# Patient Record
Sex: FEMALE | Race: WHITE | NOT HISPANIC OR LATINO
[De-identification: names, ages, dates, MRNs, and addresses within clinical notes are randomized per-mention and may not be internally consistent; named-entity substitution may affect disease eponyms.]

---

## 2018-01-22 ENCOUNTER — APPOINTMENT (OUTPATIENT)
Dept: SURGERY | Facility: CLINIC | Age: 73
End: 2018-01-22
Payer: MEDICARE

## 2018-01-22 PROCEDURE — 99213 OFFICE O/P EST LOW 20 MIN: CPT

## 2018-03-19 ENCOUNTER — APPOINTMENT (OUTPATIENT)
Dept: OPHTHALMOLOGY | Facility: CLINIC | Age: 73
End: 2018-03-19
Payer: MEDICARE

## 2018-03-19 PROCEDURE — 92014 COMPRE OPH EXAM EST PT 1/>: CPT

## 2018-12-31 ENCOUNTER — APPOINTMENT (OUTPATIENT)
Dept: SURGERY | Facility: CLINIC | Age: 73
End: 2018-12-31
Payer: MEDICARE

## 2018-12-31 DIAGNOSIS — D05.10 INTRADUCTAL CARCINOMA IN SITU OF UNSPECIFIED BREAST: ICD-10-CM

## 2018-12-31 PROCEDURE — 99213 OFFICE O/P EST LOW 20 MIN: CPT

## 2018-12-31 RX ORDER — FLASH GLUCOSE SCANNING READER
EACH MISCELLANEOUS
Qty: 1 | Refills: 0 | Status: DISCONTINUED | COMMUNITY
Start: 2018-12-20

## 2018-12-31 RX ORDER — INSULIN ASPART 100 [IU]/ML
100 INJECTION, SOLUTION INTRAVENOUS; SUBCUTANEOUS
Qty: 15 | Refills: 0 | Status: DISCONTINUED | COMMUNITY
Start: 2018-01-02

## 2018-12-31 RX ORDER — BLOOD-GLUCOSE METER
70 EACH MISCELLANEOUS
Qty: 100 | Refills: 0 | Status: DISCONTINUED | COMMUNITY
Start: 2018-10-14

## 2018-12-31 RX ORDER — FLASH GLUCOSE SENSOR
KIT MISCELLANEOUS
Qty: 2 | Refills: 0 | Status: DISCONTINUED | COMMUNITY
Start: 2018-12-20

## 2018-12-31 RX ORDER — BLOOD SUGAR DIAGNOSTIC
STRIP MISCELLANEOUS
Qty: 100 | Refills: 0 | Status: DISCONTINUED | COMMUNITY
Start: 2017-11-29

## 2018-12-31 RX ORDER — PEN NEEDLE, DIABETIC 32GX 5/32"
32G X 4 MM NEEDLE, DISPOSABLE MISCELLANEOUS
Qty: 100 | Refills: 0 | Status: DISCONTINUED | COMMUNITY
Start: 2018-12-18

## 2018-12-31 RX ORDER — INSULIN GLARGINE 100 [IU]/ML
100 INJECTION, SOLUTION SUBCUTANEOUS
Qty: 30 | Refills: 0 | Status: DISCONTINUED | COMMUNITY
Start: 2018-07-10

## 2018-12-31 RX ORDER — BLOOD-GLUCOSE METER
EACH MISCELLANEOUS
Qty: 100 | Refills: 0 | Status: DISCONTINUED | COMMUNITY
Start: 2018-10-14

## 2019-04-25 ENCOUNTER — APPOINTMENT (OUTPATIENT)
Dept: OPHTHALMOLOGY | Facility: CLINIC | Age: 74
End: 2019-04-25
Payer: MEDICARE

## 2019-04-25 DIAGNOSIS — E11.9 TYPE 2 DIABETES MELLITUS W/OUT COMPLICATIONS: ICD-10-CM

## 2019-04-25 DIAGNOSIS — H35.372 PUCKERING OF MACULA, LEFT EYE: ICD-10-CM

## 2019-04-25 PROCEDURE — 92225: CPT | Mod: LT

## 2019-04-25 PROCEDURE — 92014 COMPRE OPH EXAM EST PT 1/>: CPT

## 2019-11-27 ENCOUNTER — APPOINTMENT (OUTPATIENT)
Dept: SURGERY | Facility: CLINIC | Age: 74
End: 2019-11-27

## 2020-09-26 DIAGNOSIS — Z01.818 ENCOUNTER FOR OTHER PREPROCEDURAL EXAMINATION: ICD-10-CM

## 2020-09-28 ENCOUNTER — APPOINTMENT (OUTPATIENT)
Dept: DISASTER EMERGENCY | Facility: CLINIC | Age: 75
End: 2020-09-28

## 2020-09-28 DIAGNOSIS — Z01.818 ENCOUNTER FOR OTHER PREPROCEDURAL EXAMINATION: ICD-10-CM

## 2020-09-29 LAB — SARS-COV-2 N GENE NPH QL NAA+PROBE: NOT DETECTED

## 2020-10-01 ENCOUNTER — OUTPATIENT (OUTPATIENT)
Dept: OUTPATIENT SERVICES | Facility: HOSPITAL | Age: 75
LOS: 1 days | Discharge: HOME | End: 2020-10-01
Payer: MEDICARE

## 2020-10-01 VITALS — HEIGHT: 60 IN | WEIGHT: 143.08 LBS

## 2020-10-01 DIAGNOSIS — Z98.890 OTHER SPECIFIED POSTPROCEDURAL STATES: Chronic | ICD-10-CM

## 2020-10-01 DIAGNOSIS — Z90.710 ACQUIRED ABSENCE OF BOTH CERVIX AND UTERUS: Chronic | ICD-10-CM

## 2020-10-01 DIAGNOSIS — Z98.49 CATARACT EXTRACTION STATUS, UNSPECIFIED EYE: Chronic | ICD-10-CM

## 2020-10-01 DIAGNOSIS — R07.9 CHEST PAIN, UNSPECIFIED: ICD-10-CM

## 2020-10-01 DIAGNOSIS — Z90.89 ACQUIRED ABSENCE OF OTHER ORGANS: Chronic | ICD-10-CM

## 2020-10-01 DIAGNOSIS — Z90.13 ACQUIRED ABSENCE OF BILATERAL BREASTS AND NIPPLES: Chronic | ICD-10-CM

## 2020-10-01 PROBLEM — Z01.818 PREOP TESTING: Status: ACTIVE | Noted: 2020-10-01

## 2020-10-01 LAB
ANION GAP SERPL CALC-SCNC: 12 MMO/L — SIGNIFICANT CHANGE UP (ref 7–14)
BUN SERPL-MCNC: 18 MG/DL — SIGNIFICANT CHANGE UP (ref 7–23)
CALCIUM SERPL-MCNC: 9.6 MG/DL — SIGNIFICANT CHANGE UP (ref 8.4–10.5)
CHLORIDE SERPL-SCNC: 106 MMOL/L — SIGNIFICANT CHANGE UP (ref 98–107)
CO2 SERPL-SCNC: 23 MMOL/L — SIGNIFICANT CHANGE UP (ref 22–31)
CREAT SERPL-MCNC: 1.1 MG/DL — SIGNIFICANT CHANGE UP (ref 0.5–1.3)
GLUCOSE BLDC GLUCOMTR-MCNC: 158 MG/DL — HIGH (ref 70–99)
GLUCOSE SERPL-MCNC: 165 MG/DL — HIGH (ref 70–99)
HBA1C BLD-MCNC: 8.1 % — HIGH (ref 4–5.6)
HCT VFR BLD CALC: 31.7 % — LOW (ref 34.5–45)
HGB BLD-MCNC: 10 G/DL — LOW (ref 11.5–15.5)
MCHC RBC-ENTMCNC: 27.2 PG — SIGNIFICANT CHANGE UP (ref 27–34)
MCHC RBC-ENTMCNC: 31.5 % — LOW (ref 32–36)
MCV RBC AUTO: 86.4 FL — SIGNIFICANT CHANGE UP (ref 80–100)
NRBC # FLD: 0 K/UL — SIGNIFICANT CHANGE UP (ref 0–0)
PLATELET # BLD AUTO: 270 K/UL — SIGNIFICANT CHANGE UP (ref 150–400)
PMV BLD: 11.3 FL — SIGNIFICANT CHANGE UP (ref 7–13)
POTASSIUM SERPL-MCNC: 5.1 MMOL/L — SIGNIFICANT CHANGE UP (ref 3.5–5.3)
POTASSIUM SERPL-SCNC: 5.1 MMOL/L — SIGNIFICANT CHANGE UP (ref 3.5–5.3)
RBC # BLD: 3.67 M/UL — LOW (ref 3.8–5.2)
RBC # FLD: 12.9 % — SIGNIFICANT CHANGE UP (ref 10.3–14.5)
SODIUM SERPL-SCNC: 141 MMOL/L — SIGNIFICANT CHANGE UP (ref 135–145)
WBC # BLD: 9.4 K/UL — SIGNIFICANT CHANGE UP (ref 3.8–10.5)
WBC # FLD AUTO: 9.4 K/UL — SIGNIFICANT CHANGE UP (ref 3.8–10.5)

## 2020-10-01 PROCEDURE — 93010 ELECTROCARDIOGRAM REPORT: CPT

## 2020-10-01 RX ORDER — SITAGLIPTIN AND METFORMIN HYDROCHLORIDE 500; 50 MG/1; MG/1
1 TABLET, FILM COATED ORAL
Qty: 0 | Refills: 0 | DISCHARGE

## 2020-10-01 RX ORDER — SODIUM CHLORIDE 9 MG/ML
3 INJECTION INTRAMUSCULAR; INTRAVENOUS; SUBCUTANEOUS EVERY 8 HOURS
Refills: 0 | Status: DISCONTINUED | OUTPATIENT
Start: 2020-10-01 | End: 2020-10-15

## 2020-10-01 NOTE — H&P CARDIOLOGY - RS GEN PE MLT RESP DETAILS PC
clear to auscultation bilaterally/no chest wall tenderness/respirations non-labored/breath sounds equal/no intercostal retractions/no rales/no wheezes/no rhonchi/good air movement/airway patent

## 2020-10-01 NOTE — H&P CARDIOLOGY - FAMILY HISTORY
Father  Still living? No  Family history of heart disease, Age at diagnosis: Age Unknown     Sibling  Still living? Yes, Estimated age: Age Unknown  Family history of heart disease, Age at diagnosis: Age Unknown

## 2020-10-01 NOTE — H&P CARDIOLOGY - PSH
S/P bilateral mastectomy    S/P carotid endarterectomy  Right  S/P carpal tunnel release  Right 2006, left 2007  S/P cataract extraction    S/P hysterectomy with oophorectomy    S/P rotator cuff repair  Bilateral  S/P tonsillectomy

## 2020-10-01 NOTE — H&P CARDIOLOGY - PMH
BRCA1 gene mutation positive    Breast cancer  s/p bilateral mastectomy  Carotid stenosis  s/p right carotid endarterectomy  Cataract    DM (diabetes mellitus)    GERD (gastroesophageal reflux disease)    HLD (hyperlipidemia)    HTN (hypertension)

## 2020-10-01 NOTE — CONSULT NOTE ADULT - SUBJECTIVE AND OBJECTIVE BOX
Patient is a 74y old  Female who presents with a chief complaint of ROPER.   73 yo with DM, HTN, presented with ROPER.   No chest pain.  Pt had positive EST (neg Thallium but symptoms and ECG changes).  Non smoker.       History of Present Illness	  73 y/o female with a PMHx of carotid stenosis s/p right carotid endarterectomy, HTN, HLD, DM, GERD, cataract s/p extraction, and breast cancer s/p bilateral mastectomy (BRCA positive) presents for elective cardiac catheterization. Pt has been experiencing dyspnea on exertion for the past couple of months, typically when walking up a flight of stairs or when lifting/carrying something heavy. Pt only reports these symptoms on exertion and does not report any shortness of breath at rest. Pt has also noticed intermittent, mild, non-pleuritic, non-radiating, substernal chest "heaviness" which is random and not necessarily provoked by exertion. Pt says the heaviness is there about 80% of the time but is very mild and does not really affect her significantly. . Pt denies fever, chills, recent travel, headache, dizziness, visual deficits, orthopnea, palpitations, abdominal pain, N/V/D/C, hematochezia, melena, dysuria, hematuria, LOC, syncope, peripheral edema. In light of patients cardiac risk factors, symptoms and abnormal noninvasive test findings there is high suspicion for CAD. Patient is now referred to [facility] for a cardiac catheterization with possible PTCA/stent.  Past Medical History:  BRCA1 gene mutation positive    Breast cancer  s/p bilateral mastectomy  Carotid stenosis  s/p right carotid endarterectomy  Cataract    DM (diabetes mellitus)    GERD (gastroesophageal reflux disease)    HLD (hyperlipidemia)    HTN (hypertension).    Past Surgical History:  S/P bilateral mastectomy    S/P carotid endarterectomy  Right  S/P carpal tunnel release  Right 2006, left 2007  S/P cataract extraction    S/P hysterectomy with oophorectomy    S/P rotator cuff repair  Bilateral  S/P tonsillectomy.      Allergies    No Known Allergies    Intolerances        REVIEW OF SYSTEMS:  CARDIOVASCULAR: No chest pain, palpitations, dizziness, or leg swelling; no shortness of breath     RESPIRATORY: No cough, wheezing, chills or hemoptysis; No shortness of breath    GASTROINTESTINAL: No abdominal or epigastric pain. No nausea, vomiting, or hematemesis; No diarrhea or constipation. No melena or hematochezia.    NEUROLOGICAL: No headaches, memory loss, loss of strength, numbness      PHYSICAL EXAM:  --    GENERAL: NAD, well-groomed, well-developed  HEAD:  Atraumatic, Normocephalic  EYES: EOMI, PERRLA, conjunctiva and sclera clear  NECK: Supple, No JVD, Normal thyroid  NERVOUS SYSTEM:  Alert & Oriented X3, Good concentration;  and symmetric  CHEST/LUNG: Clear to auscultation bilaterally; No rales, rhonchi, wheezing, or rubs  HEART: S1S2 regular, without murmur, rub nor gallop  ABDOMEN: Soft, Nontender, Nondistended; Bowel sounds present  EXTREMITIES:  2+ Peripheral Pulses, No clubbing, cyanosis, or edema      LABS:                        10.0   9.40  )-----------( 270      ( 01 Oct 2020 07:16 )             31.7     01 Oct 2020 07:16    141    |  106    |  18     ----------------------------<  165    5.1     |  23     |  1.10     Ca    9.6        01 Oct 2020 07:16    Covid negative    CAPILLARY BLOOD GLUCOSE    ECG- NSR, NSSTTW' changes             assessment:  pt admitted for cath.         Care Discussed with Consultants/Other Providers: Dr Michel.

## 2020-10-01 NOTE — H&P CARDIOLOGY - HISTORY OF PRESENT ILLNESS
73 y/o female with a PMHx of carotid stenosis s/p right carotid endarterectomy, HTN, HLD, DM, GERD, cataract s/p extraction, and breast cancer s/p bilateral mastectomy (BRCA positive) presents for elective cardiac catheterization. Pt has been experiencing dyspnea on exertion for the past couple of months, typically when walking up a flight of stairs or when lifting/carrying something heavy. Pt only reports these symptoms on exertion and does not report any shortness of breath at rest. Pt has also noticed intermittent, mild, non-pleuritic, non-radiating, substernal chest "heaviness" which is random and not necessarily provoked by exertion. Pt says the heaviness is there about 80% of the time but is very mild and does not really affect her significantly. Pt saw her cardiologist, Dr. Daly, and underwent nuclear stress test which was reportedly abnormal (NST results not attached to booking sheets/paperwork). Pt denies fever, chills, recent travel, headache, dizziness, visual deficits, orthopnea, palpitations, abdominal pain, N/V/D/C, hematochezia, melena, dysuria, hematuria, LOC, syncope, peripheral edema. In light of patients cardiac risk factors, symptoms and abnormal noninvasive test findings there is high suspicion for CAD. Patient is now referred to [facility] for a cardiac catheterization with possible PTCA/stent.    Cardiologist: Dr. Manuel Daly  COVID: Negative 9/28

## 2020-11-09 PROBLEM — I10 ESSENTIAL (PRIMARY) HYPERTENSION: Chronic | Status: ACTIVE | Noted: 2020-10-01

## 2020-11-09 PROBLEM — K21.9 GASTRO-ESOPHAGEAL REFLUX DISEASE WITHOUT ESOPHAGITIS: Chronic | Status: ACTIVE | Noted: 2020-10-01

## 2020-11-09 PROBLEM — E78.5 HYPERLIPIDEMIA, UNSPECIFIED: Chronic | Status: ACTIVE | Noted: 2020-10-01

## 2020-11-09 PROBLEM — E11.9 TYPE 2 DIABETES MELLITUS WITHOUT COMPLICATIONS: Chronic | Status: ACTIVE | Noted: 2020-10-01

## 2020-11-09 PROBLEM — C50.919 MALIGNANT NEOPLASM OF UNSPECIFIED SITE OF UNSPECIFIED FEMALE BREAST: Chronic | Status: ACTIVE | Noted: 2020-10-01

## 2020-11-09 PROBLEM — Z15.01 GENETIC SUSCEPTIBILITY TO MALIGNANT NEOPLASM OF BREAST: Chronic | Status: ACTIVE | Noted: 2020-10-01

## 2020-11-09 PROBLEM — I65.29 OCCLUSION AND STENOSIS OF UNSPECIFIED CAROTID ARTERY: Chronic | Status: ACTIVE | Noted: 2020-10-01

## 2021-01-06 ENCOUNTER — APPOINTMENT (OUTPATIENT)
Dept: OPHTHALMOLOGY | Facility: CLINIC | Age: 76
End: 2021-01-06

## 2022-05-26 ENCOUNTER — APPOINTMENT (OUTPATIENT)
Dept: OPHTHALMOLOGY | Facility: CLINIC | Age: 77
End: 2022-05-26
Payer: MEDICARE

## 2022-05-26 ENCOUNTER — NON-APPOINTMENT (OUTPATIENT)
Age: 77
End: 2022-05-26

## 2022-05-26 PROCEDURE — 92004 COMPRE OPH EXAM NEW PT 1/>: CPT

## 2024-01-28 ENCOUNTER — INPATIENT (INPATIENT)
Facility: HOSPITAL | Age: 79
LOS: 1 days | Discharge: HOME | End: 2024-01-30
Attending: INTERNAL MEDICINE | Admitting: INTERNAL MEDICINE
Payer: MEDICARE

## 2024-01-28 VITALS
TEMPERATURE: 98 F | RESPIRATION RATE: 18 BRPM | OXYGEN SATURATION: 99 % | DIASTOLIC BLOOD PRESSURE: 50 MMHG | SYSTOLIC BLOOD PRESSURE: 143 MMHG | HEART RATE: 41 BPM

## 2024-01-28 DIAGNOSIS — Z98.890 OTHER SPECIFIED POSTPROCEDURAL STATES: Chronic | ICD-10-CM

## 2024-01-28 DIAGNOSIS — Z90.710 ACQUIRED ABSENCE OF BOTH CERVIX AND UTERUS: Chronic | ICD-10-CM

## 2024-01-28 DIAGNOSIS — Z98.49 CATARACT EXTRACTION STATUS, UNSPECIFIED EYE: Chronic | ICD-10-CM

## 2024-01-28 DIAGNOSIS — J81.0 ACUTE PULMONARY EDEMA: ICD-10-CM

## 2024-01-28 DIAGNOSIS — Z90.13 ACQUIRED ABSENCE OF BILATERAL BREASTS AND NIPPLES: Chronic | ICD-10-CM

## 2024-01-28 DIAGNOSIS — Z90.89 ACQUIRED ABSENCE OF OTHER ORGANS: Chronic | ICD-10-CM

## 2024-01-28 LAB
ALBUMIN SERPL ELPH-MCNC: 3.9 G/DL — SIGNIFICANT CHANGE UP (ref 3.3–5)
ALP SERPL-CCNC: 66 U/L — SIGNIFICANT CHANGE UP (ref 40–120)
ALT FLD-CCNC: 33 U/L — SIGNIFICANT CHANGE UP (ref 4–33)
ANION GAP SERPL CALC-SCNC: 11 MMOL/L — SIGNIFICANT CHANGE UP (ref 7–14)
ANION GAP SERPL CALC-SCNC: 12 MMOL/L — SIGNIFICANT CHANGE UP (ref 7–14)
APTT BLD: 31.8 SEC — SIGNIFICANT CHANGE UP (ref 24.5–35.6)
AST SERPL-CCNC: 45 U/L — HIGH (ref 4–32)
BASE EXCESS BLDV CALC-SCNC: -3.2 MMOL/L — LOW (ref -2–3)
BASOPHILS # BLD AUTO: 0.03 K/UL — SIGNIFICANT CHANGE UP (ref 0–0.2)
BASOPHILS NFR BLD AUTO: 0.2 % — SIGNIFICANT CHANGE UP (ref 0–2)
BILIRUB SERPL-MCNC: <0.2 MG/DL — SIGNIFICANT CHANGE UP (ref 0.2–1.2)
BLOOD GAS VENOUS COMPREHENSIVE RESULT: SIGNIFICANT CHANGE UP
BUN SERPL-MCNC: 23 MG/DL — SIGNIFICANT CHANGE UP (ref 7–23)
BUN SERPL-MCNC: 26 MG/DL — HIGH (ref 7–23)
CALCIUM SERPL-MCNC: 8.7 MG/DL — SIGNIFICANT CHANGE UP (ref 8.4–10.5)
CALCIUM SERPL-MCNC: 9.5 MG/DL — SIGNIFICANT CHANGE UP (ref 8.4–10.5)
CHLORIDE BLDV-SCNC: 105 MMOL/L — SIGNIFICANT CHANGE UP (ref 96–108)
CHLORIDE SERPL-SCNC: 104 MMOL/L — SIGNIFICANT CHANGE UP (ref 98–107)
CHLORIDE SERPL-SCNC: 107 MMOL/L — SIGNIFICANT CHANGE UP (ref 98–107)
CO2 BLDV-SCNC: 24.6 MMOL/L — SIGNIFICANT CHANGE UP (ref 22–26)
CO2 SERPL-SCNC: 20 MMOL/L — LOW (ref 22–31)
CO2 SERPL-SCNC: 21 MMOL/L — LOW (ref 22–31)
CREAT SERPL-MCNC: 1.34 MG/DL — HIGH (ref 0.5–1.3)
CREAT SERPL-MCNC: 1.51 MG/DL — HIGH (ref 0.5–1.3)
EGFR: 35 ML/MIN/1.73M2 — LOW
EGFR: 41 ML/MIN/1.73M2 — LOW
EOSINOPHIL # BLD AUTO: 0.11 K/UL — SIGNIFICANT CHANGE UP (ref 0–0.5)
EOSINOPHIL NFR BLD AUTO: 0.9 % — SIGNIFICANT CHANGE UP (ref 0–6)
GAS PNL BLDV: 134 MMOL/L — LOW (ref 136–145)
GAS PNL BLDV: SIGNIFICANT CHANGE UP
GLUCOSE BLDC GLUCOMTR-MCNC: 206 MG/DL — HIGH (ref 70–99)
GLUCOSE BLDV-MCNC: 177 MG/DL — HIGH (ref 70–99)
GLUCOSE SERPL-MCNC: 169 MG/DL — HIGH (ref 70–99)
GLUCOSE SERPL-MCNC: 249 MG/DL — HIGH (ref 70–99)
HCO3 BLDV-SCNC: 23 MMOL/L — SIGNIFICANT CHANGE UP (ref 22–29)
HCT VFR BLD CALC: 30.6 % — LOW (ref 34.5–45)
HCT VFR BLDA CALC: 34 % — LOW (ref 34.5–46.5)
HGB BLD CALC-MCNC: 11.3 G/DL — LOW (ref 11.7–16.1)
HGB BLD-MCNC: 9.4 G/DL — LOW (ref 11.5–15.5)
IANC: 8.38 K/UL — HIGH (ref 1.8–7.4)
IMM GRANULOCYTES NFR BLD AUTO: 0.3 % — SIGNIFICANT CHANGE UP (ref 0–0.9)
INR BLD: 0.94 RATIO — SIGNIFICANT CHANGE UP (ref 0.85–1.18)
LACTATE BLDV-MCNC: 1.5 MMOL/L — SIGNIFICANT CHANGE UP (ref 0.5–2)
LYMPHOCYTES # BLD AUTO: 24.1 % — SIGNIFICANT CHANGE UP (ref 13–44)
LYMPHOCYTES # BLD AUTO: 3 K/UL — SIGNIFICANT CHANGE UP (ref 1–3.3)
MAGNESIUM SERPL-MCNC: 1.6 MG/DL — SIGNIFICANT CHANGE UP (ref 1.6–2.6)
MCHC RBC-ENTMCNC: 26.5 PG — LOW (ref 27–34)
MCHC RBC-ENTMCNC: 30.7 GM/DL — LOW (ref 32–36)
MCV RBC AUTO: 86.2 FL — SIGNIFICANT CHANGE UP (ref 80–100)
MONOCYTES # BLD AUTO: 0.9 K/UL — SIGNIFICANT CHANGE UP (ref 0–0.9)
MONOCYTES NFR BLD AUTO: 7.2 % — SIGNIFICANT CHANGE UP (ref 2–14)
NEUTROPHILS # BLD AUTO: 8.38 K/UL — HIGH (ref 1.8–7.4)
NEUTROPHILS NFR BLD AUTO: 67.3 % — SIGNIFICANT CHANGE UP (ref 43–77)
NRBC # BLD: 0 /100 WBCS — SIGNIFICANT CHANGE UP (ref 0–0)
NRBC # FLD: 0 K/UL — SIGNIFICANT CHANGE UP (ref 0–0)
NT-PROBNP SERPL-SCNC: 2282 PG/ML — HIGH
PCO2 BLDV: 46 MMHG — SIGNIFICANT CHANGE UP (ref 39–52)
PH BLDV: 7.31 — LOW (ref 7.32–7.43)
PHOSPHATE SERPL-MCNC: 3.9 MG/DL — SIGNIFICANT CHANGE UP (ref 2.5–4.5)
PLATELET # BLD AUTO: 297 K/UL — SIGNIFICANT CHANGE UP (ref 150–400)
PO2 BLDV: 35 MMHG — SIGNIFICANT CHANGE UP (ref 25–45)
POTASSIUM BLDV-SCNC: 5 MMOL/L — SIGNIFICANT CHANGE UP (ref 3.5–5.1)
POTASSIUM SERPL-MCNC: 5 MMOL/L — SIGNIFICANT CHANGE UP (ref 3.5–5.3)
POTASSIUM SERPL-MCNC: 5.8 MMOL/L — HIGH (ref 3.5–5.3)
POTASSIUM SERPL-SCNC: 5 MMOL/L — SIGNIFICANT CHANGE UP (ref 3.5–5.3)
POTASSIUM SERPL-SCNC: 5.8 MMOL/L — HIGH (ref 3.5–5.3)
PROT SERPL-MCNC: 6.9 G/DL — SIGNIFICANT CHANGE UP (ref 6–8.3)
PROTHROM AB SERPL-ACNC: 10.6 SEC — SIGNIFICANT CHANGE UP (ref 9.5–13)
RBC # BLD: 3.55 M/UL — LOW (ref 3.8–5.2)
RBC # FLD: 13.6 % — SIGNIFICANT CHANGE UP (ref 10.3–14.5)
SAO2 % BLDV: 51.5 % — LOW (ref 67–88)
SODIUM SERPL-SCNC: 135 MMOL/L — SIGNIFICANT CHANGE UP (ref 135–145)
SODIUM SERPL-SCNC: 140 MMOL/L — SIGNIFICANT CHANGE UP (ref 135–145)
TROPONIN T, HIGH SENSITIVITY RESULT: 18 NG/L — SIGNIFICANT CHANGE UP
TROPONIN T, HIGH SENSITIVITY RESULT: 18 NG/L — SIGNIFICANT CHANGE UP
WBC # BLD: 12.46 K/UL — HIGH (ref 3.8–10.5)
WBC # FLD AUTO: 12.46 K/UL — HIGH (ref 3.8–10.5)

## 2024-01-28 PROCEDURE — 99285 EMERGENCY DEPT VISIT HI MDM: CPT

## 2024-01-28 PROCEDURE — 93010 ELECTROCARDIOGRAM REPORT: CPT

## 2024-01-28 PROCEDURE — 71045 X-RAY EXAM CHEST 1 VIEW: CPT | Mod: 26

## 2024-01-28 RX ORDER — FUROSEMIDE 40 MG
40 TABLET ORAL
Refills: 0 | Status: DISCONTINUED | OUTPATIENT
Start: 2024-01-28 | End: 2024-01-29

## 2024-01-28 RX ORDER — ASPIRIN AND DIPYRIDAMOLE 25; 200 MG/1; MG/1
1 CAPSULE, EXTENDED RELEASE ORAL
Qty: 0 | Refills: 0 | DISCHARGE

## 2024-01-28 RX ORDER — INSULIN GLARGINE 100 [IU]/ML
30 INJECTION, SOLUTION SUBCUTANEOUS
Qty: 0 | Refills: 0 | DISCHARGE

## 2024-01-28 RX ORDER — MAGNESIUM SULFATE 500 MG/ML
2 VIAL (ML) INJECTION ONCE
Refills: 0 | Status: COMPLETED | OUTPATIENT
Start: 2024-01-28 | End: 2024-01-28

## 2024-01-28 RX ORDER — DEXTROSE 50 % IN WATER 50 %
25 SYRINGE (ML) INTRAVENOUS ONCE
Refills: 0 | Status: DISCONTINUED | OUTPATIENT
Start: 2024-01-28 | End: 2024-01-30

## 2024-01-28 RX ORDER — INSULIN GLARGINE 100 [IU]/ML
32 INJECTION, SOLUTION SUBCUTANEOUS EVERY MORNING
Refills: 0 | Status: DISCONTINUED | OUTPATIENT
Start: 2024-01-28 | End: 2024-01-29

## 2024-01-28 RX ORDER — LOSARTAN POTASSIUM 100 MG/1
1 TABLET, FILM COATED ORAL
Qty: 0 | Refills: 0 | DISCHARGE

## 2024-01-28 RX ORDER — HEPARIN SODIUM 5000 [USP'U]/ML
5000 INJECTION INTRAVENOUS; SUBCUTANEOUS EVERY 8 HOURS
Refills: 0 | Status: DISCONTINUED | OUTPATIENT
Start: 2024-01-28 | End: 2024-01-30

## 2024-01-28 RX ORDER — DEXTROSE 50 % IN WATER 50 %
12.5 SYRINGE (ML) INTRAVENOUS ONCE
Refills: 0 | Status: DISCONTINUED | OUTPATIENT
Start: 2024-01-28 | End: 2024-01-30

## 2024-01-28 RX ORDER — NITROGLYCERIN 6.5 MG
0.4 CAPSULE, EXTENDED RELEASE ORAL ONCE
Refills: 0 | Status: DISCONTINUED | OUTPATIENT
Start: 2024-01-28 | End: 2024-01-28

## 2024-01-28 RX ORDER — DEXTROSE 50 % IN WATER 50 %
50 SYRINGE (ML) INTRAVENOUS ONCE
Refills: 0 | Status: COMPLETED | OUTPATIENT
Start: 2024-01-28 | End: 2024-01-28

## 2024-01-28 RX ORDER — SODIUM ZIRCONIUM CYCLOSILICATE 10 G/10G
10 POWDER, FOR SUSPENSION ORAL ONCE
Refills: 0 | Status: COMPLETED | OUTPATIENT
Start: 2024-01-28 | End: 2024-01-28

## 2024-01-28 RX ORDER — MAGNESIUM SULFATE 500 MG/ML
1 VIAL (ML) INJECTION ONCE
Refills: 0 | Status: DISCONTINUED | OUTPATIENT
Start: 2024-01-28 | End: 2024-01-28

## 2024-01-28 RX ORDER — INSULIN HUMAN 100 [IU]/ML
5 INJECTION, SOLUTION SUBCUTANEOUS ONCE
Refills: 0 | Status: COMPLETED | OUTPATIENT
Start: 2024-01-28 | End: 2024-01-28

## 2024-01-28 RX ORDER — CALCIUM GLUCONATE 100 MG/ML
2 VIAL (ML) INTRAVENOUS ONCE
Refills: 0 | Status: COMPLETED | OUTPATIENT
Start: 2024-01-28 | End: 2024-01-28

## 2024-01-28 RX ORDER — PANTOPRAZOLE SODIUM 20 MG/1
40 TABLET, DELAYED RELEASE ORAL
Refills: 0 | Status: DISCONTINUED | OUTPATIENT
Start: 2024-01-28 | End: 2024-01-30

## 2024-01-28 RX ORDER — INSULIN LISPRO 100/ML
VIAL (ML) SUBCUTANEOUS AT BEDTIME
Refills: 0 | Status: DISCONTINUED | OUTPATIENT
Start: 2024-01-28 | End: 2024-01-30

## 2024-01-28 RX ORDER — HYDRALAZINE HCL 50 MG
25 TABLET ORAL ONCE
Refills: 0 | Status: COMPLETED | OUTPATIENT
Start: 2024-01-28 | End: 2024-01-28

## 2024-01-28 RX ORDER — SODIUM CHLORIDE 9 MG/ML
1000 INJECTION INTRAMUSCULAR; INTRAVENOUS; SUBCUTANEOUS ONCE
Refills: 0 | Status: COMPLETED | OUTPATIENT
Start: 2024-01-28 | End: 2024-01-28

## 2024-01-28 RX ORDER — ATORVASTATIN CALCIUM 80 MG/1
1 TABLET, FILM COATED ORAL
Qty: 0 | Refills: 0 | DISCHARGE

## 2024-01-28 RX ORDER — IRBESARTAN 75 MG/1
1 TABLET ORAL
Refills: 0 | DISCHARGE

## 2024-01-28 RX ORDER — FUROSEMIDE 40 MG
40 TABLET ORAL ONCE
Refills: 0 | Status: COMPLETED | OUTPATIENT
Start: 2024-01-28 | End: 2024-01-28

## 2024-01-28 RX ORDER — INSULIN LISPRO 100/ML
VIAL (ML) SUBCUTANEOUS
Refills: 0 | Status: DISCONTINUED | OUTPATIENT
Start: 2024-01-28 | End: 2024-01-30

## 2024-01-28 RX ORDER — HYDRALAZINE HCL 50 MG
10 TABLET ORAL ONCE
Refills: 0 | Status: COMPLETED | OUTPATIENT
Start: 2024-01-28 | End: 2024-01-28

## 2024-01-28 RX ORDER — OMEPRAZOLE 10 MG/1
1 CAPSULE, DELAYED RELEASE ORAL
Qty: 0 | Refills: 0 | DISCHARGE

## 2024-01-28 RX ORDER — ATORVASTATIN CALCIUM 80 MG/1
10 TABLET, FILM COATED ORAL AT BEDTIME
Refills: 0 | Status: DISCONTINUED | OUTPATIENT
Start: 2024-01-28 | End: 2024-01-30

## 2024-01-28 RX ORDER — INSULIN ASPART 100 [IU]/ML
0 INJECTION, SOLUTION SUBCUTANEOUS
Qty: 0 | Refills: 0 | DISCHARGE

## 2024-01-28 RX ORDER — OMEGA-3 ACID ETHYL ESTERS 1 G
1 CAPSULE ORAL
Qty: 0 | Refills: 0 | DISCHARGE

## 2024-01-28 RX ORDER — NITROGLYCERIN 6.5 MG
75 CAPSULE, EXTENDED RELEASE ORAL
Qty: 50 | Refills: 0 | Status: DISCONTINUED | OUTPATIENT
Start: 2024-01-28 | End: 2024-01-29

## 2024-01-28 RX ORDER — CHLORHEXIDINE GLUCONATE 213 G/1000ML
1 SOLUTION TOPICAL DAILY
Refills: 0 | Status: DISCONTINUED | OUTPATIENT
Start: 2024-01-28 | End: 2024-01-30

## 2024-01-28 RX ORDER — AMLODIPINE BESYLATE 2.5 MG/1
1 TABLET ORAL
Refills: 0 | DISCHARGE

## 2024-01-28 RX ADMIN — Medication 25 GRAM(S): at 22:07

## 2024-01-28 RX ADMIN — HEPARIN SODIUM 5000 UNIT(S): 5000 INJECTION INTRAVENOUS; SUBCUTANEOUS at 21:03

## 2024-01-28 RX ADMIN — SODIUM ZIRCONIUM CYCLOSILICATE 10 GRAM(S): 10 POWDER, FOR SUSPENSION ORAL at 15:06

## 2024-01-28 RX ADMIN — ATORVASTATIN CALCIUM 10 MILLIGRAM(S): 80 TABLET, FILM COATED ORAL at 21:03

## 2024-01-28 RX ADMIN — Medication 0.4 MILLIGRAM(S): at 16:45

## 2024-01-28 RX ADMIN — Medication 200 GRAM(S): at 15:05

## 2024-01-28 RX ADMIN — INSULIN HUMAN 5 UNIT(S): 100 INJECTION, SOLUTION SUBCUTANEOUS at 15:06

## 2024-01-28 RX ADMIN — Medication 22.5 MICROGRAM(S)/MIN: at 18:05

## 2024-01-28 RX ADMIN — Medication 25 MILLIGRAM(S): at 23:41

## 2024-01-28 RX ADMIN — Medication 10 MILLIGRAM(S): at 21:02

## 2024-01-28 RX ADMIN — Medication 40 MILLIGRAM(S): at 16:45

## 2024-01-28 RX ADMIN — SODIUM CHLORIDE 1000 MILLILITER(S): 9 INJECTION INTRAMUSCULAR; INTRAVENOUS; SUBCUTANEOUS at 15:48

## 2024-01-28 RX ADMIN — Medication 50 MILLILITER(S): at 15:05

## 2024-01-28 NOTE — ED ADULT NURSE REASSESSMENT NOTE - NS ED NURSE REASSESS COMMENT FT1
Facilitator RN- Patient urinated all over bed. Patient states " I just wasn't able to hold it all in" Linen changed perineal care provided. Cardiology PA at bedside for eval. Comfort and safety maintained. All current care needs met. Care plan continued Tracey CARLIN

## 2024-01-28 NOTE — ED PROVIDER NOTE - NSICDXFAMILYHX_GEN_ALL_CORE_FT
FAMILY HISTORY:  Father  Still living? No  Family history of heart disease, Age at diagnosis: Age Unknown    Sibling  Still living? Yes, Estimated age: Age Unknown  Family history of heart disease, Age at diagnosis: Age Unknown

## 2024-01-28 NOTE — ED ADULT NURSE REASSESSMENT NOTE - NS ED NURSE REASSESS COMMENT FT1
RN called to bedside, patient c/o SOB. Patient sitting straight up in bed, RR approximately 30. MD called to bedside, chest xray completed at bedside, lasix given, BiPAP ordered. Patient placed on 6L nasal cannula. MD and RN remain at bedside.

## 2024-01-28 NOTE — ED PROVIDER NOTE - OBJECTIVE STATEMENT
78-year-old female with a past medical history of hypertension, hyperlipidemia, diabetes, GERD presenting with dizziness. Patient woke up this morning at 4:30 with dizziness and a jittery feeling in her chest. Patient called an ambulance and found to have a pulse in the 40s. Denies pain in her chest, difficulty breathing, vomiting, fevers. Patient started metoprolol succinate 25 mg 2 weeks ago. Patient states he took the medication last night.

## 2024-01-28 NOTE — H&P ADULT - ASSESSMENT
Patient is a 78 year old male with PMHx HTN, HLD, DM on insulin and PO medication who presents with SOB x1d. CCU consulted for flash pulmonary edema. Patient requiring BIPAP and with increased work of breathing, on nitroglycerin gtt for venodilation, and responding to Lasix as per ER nurse with at least 500 cc output. Patient to be admitted to CCU for management of flash pulmonary edema in setting of HTN emergency.    PLAN:  NEUROLOGIC:  #AOx3, no active issues    RESPIRATORY:  #SOB  - Patient noted to be tachypneic and initiated on BIPAP 12/6/FIO2 100%  - Wean BIPAP as tolerated    CARDIOVASCULAR:  #Hypertensive Emergency  - BP in ED: prior to starting nitroglycerin gtt SBP 220s, now on nitroglycerin gtt @125 mcg SBP 150s  - s/p Lasix 40 mg IV BID as patient seems Lasix naive with minimum 500 cc output s/p Lasix 40 mg IVP x1 in ED, uptitrate as clinically indicated  - Arterial line for BP control  - Electrolyte, blood urea nitrogen (BUN) and creatinine levels to evaluate for renal impairment.  - Urinalysis to detect hematuria or proteinuria  - Serial cardiac enzymes q8h to rule out acute coronary syndrome.  - F/U TTE in AM to further assess for LV function and rule out wall motion abnormalities  - STAT proBNP ordered, f/u result  - Educate importance of medication adherence, weight loss, and reduced dietary salt  - KARLA on CKD- avoid nephrotoxic agents  - Troponin wnl 18 -> 18  - Goal    - PO anti-HTNs (Hydralazine 25 mg TID, continue home Amlodipine 10 mg daily) to wean nitroglycerin requirement  - Hold home Toprol iso 1st deg AV block     #HLD  - Continue Lipitor 10 mg daily    GASTROINTESTINAL:  #GERD  - Continue home PPI  #NPO while on BIPAP     /RENAL:  #KARLA  - SCr 1.51   - Hold home Irbesartan iso KARLA  - Replete lytes to maintain K>4 and Mg>2  - Avoid nephrotoxic agents    ENDOCRINE  #T2DM  - On Lantus and Janumet at home  - Hold Janumet while inpatient  - Continue Lantus and ISS  - F/U A1C this admission  #F/U TSH    ID:  #Leukocytosis  - WBC 12.46   - Consider infectious workup, pending lactate  - Denies recent infection exposure, afebrile and without obvious signs of infection on exam    DVT ppx:  #SQ heparin     Case discussed with cardiology fellow Kaci Muir MD Patient is a 78 year old male with PMHx HTN, HLD, DM on insulin and PO medication who presents with SOB x1d. CCU consulted for flash pulmonary edema. Patient requiring BIPAP and with increased work of breathing, on nitroglycerin gtt for venodilation, and responding to Lasix as per ER nurse with at least 500 cc output. Patient to be admitted to CCU for management of flash pulmonary edema in setting of HTN emergency.    PLAN:  NEUROLOGIC:  #AOx3, no active issues    RESPIRATORY:  #SOB  - Patient noted to be tachypneic and initiated on BIPAP 12/6/FIO2 100%  - Wean BIPAP as tolerated    CARDIOVASCULAR:  #Hypertensive Emergency  - BP in ED: prior to starting nitroglycerin gtt SBP 220s, now on nitroglycerin gtt @125 mcg SBP 150s  - s/p Lasix 40 mg IV BID as patient seems Lasix naive with minimum 500 cc output s/p Lasix 40 mg IVP x1 in ED, uptitrate as clinically indicated  - Arterial line for BP control  - Electrolyte, blood urea nitrogen (BUN) and creatinine levels to evaluate for renal impairment.  - Urinalysis to detect hematuria or proteinuria  - Serial cardiac enzymes q8h to rule out acute coronary syndrome.  - F/U TTE in AM to further assess for LV function and rule out wall motion abnormalities  - STAT proBNP ordered, f/u result  - Educate importance of medication adherence, weight loss, and reduced dietary salt  - KARLA on CKD- avoid nephrotoxic agents  - Troponin wnl 18 -> 18  - Goal    - PO anti-HTNs (Hydralazine 25 mg TID, continue home Amlodipine 10 mg daily) to wean nitroglycerin requirement  - Hold home Toprol iso 1st deg AV block     #HLD  - Continue Lipitor 10 mg daily    GASTROINTESTINAL:  #GERD  - Continue home PPI  #NPO while on BIPAP     /RENAL:  #KARLA  - SCr 1.51   - Hold home Irbesartan iso KARLA  - Replete lytes to maintain K>4 and Mg>2, K 5.8 s/p hyperkalemia protocol, Mg 1.6   - Avoid nephrotoxic agents    ENDOCRINE:  #T2DM  - On Lantus and Janumet at home  - Hold Janumet while inpatient  - Continue Lantus and ISS  - F/U A1C this admission  #F/U TSH    ID:  #Leukocytosis  - WBC 12.46   - Consider infectious workup, pending lactate  - Denies recent infection exposure, afebrile and without obvious signs of infection on exam    DVT ppx:  #SQ heparin     Case discussed with cardiology fellow Kaci Muir MD Patient is a 78 year old male with PMHx HTN, HLD, DM on insulin and PO medication who presents with SOB x1d. CCU consulted for flash pulmonary edema. Patient requiring BIPAP and with increased work of breathing, on nitroglycerin gtt for venodilation, and responding to Lasix as per ER nurse with at least 500 cc output. Patient to be admitted to CCU for management of flash pulmonary edema in setting of HTN emergency.    PLAN:  NEUROLOGIC:  #AOx3, no active issues    RESPIRATORY:  #SOB  - Patient noted to be tachypneic and initiated on BIPAP 12/6/FIO2 100%  - Wean BIPAP as tolerated    CARDIOVASCULAR:  #Hypertensive Emergency  POCUS showing grossly preserved EF, LV>RV, unable to appreciate pericardial effusion.   - BP in ED: prior to starting nitroglycerin gtt SBP 220s, now on nitroglycerin gtt @125 mcg SBP 150s  - s/p Lasix 40 mg IV BID as patient seems Lasix naive with minimum 500 cc output s/p Lasix 40 mg IVP x1 in ED, uptitrate as clinically indicated  - Arterial line for BP control  - Electrolyte, blood urea nitrogen (BUN) and creatinine levels to evaluate for renal impairment.  - Urinalysis to detect hematuria or proteinuria  - Serial cardiac enzymes q8h to rule out acute coronary syndrome.  - F/U TTE in AM to further assess for LV function and rule out wall motion abnormalities  - STAT proBNP ordered, f/u result  - Educate importance of medication adherence, weight loss, and reduced dietary salt  - KARLA on CKD- avoid nephrotoxic agents  - Troponin wnl 18 -> 18  - Goal    - PO anti-HTNs (Hydralazine 25 mg TID, continue home Amlodipine 10 mg daily) to wean nitroglycerin requirement  - Hold home Toprol iso 1st deg AV block     #HLD  - Continue Lipitor 10 mg daily    GASTROINTESTINAL:  #GERD  - Continue home PPI  #NPO while on BIPAP     /RENAL:  #KARLA  - SCr 1.51   - Hold home Irbesartan iso KARLA  - Replete lytes to maintain K>4 and Mg>2, K 5.8 s/p hyperkalemia protocol, Mg 1.6   - Avoid nephrotoxic agents    ENDOCRINE:  #T2DM  - On Lantus and Janumet at home  - Hold Janumet while inpatient  - Continue Lantus and ISS  - F/U A1C this admission  #F/U TSH    ID:  #Leukocytosis  - WBC 12.46   - Consider infectious workup, pending lactate  - Denies recent infection exposure, afebrile and without obvious signs of infection on exam    DVT ppx:  #SQ heparin     Case discussed with cardiology fellow Kaci Muir MD

## 2024-01-28 NOTE — ED PROVIDER NOTE - PROGRESS NOTE DETAILS
May,  (PGY-3): Patient became acutely short of breath, tripoding, B-lines on POCUS, hypertensive. Patient given 1 tab of sublingual nitro with improvement of symptoms as well as started on BiPAP and nitroglycerin drip. Patient require CCU consult. CLAUDIA ABARCA: CCU aware - they will evaluate patient.

## 2024-01-28 NOTE — H&P ADULT - NSHPLABSRESULTS_GEN_ALL_CORE
Vitals:  ============  T(F): 97.7 (28 Jan 2024 13:22), Max: 97.7 (28 Jan 2024 13:22)  HR: 84 (28 Jan 2024 16:45)  BP: 226/66 (28 Jan 2024 16:45)  RR: 26 (28 Jan 2024 16:47)  SpO2: 100% (28 Jan 2024 17:26) (90% - 100%)  temp max in last 48H T(F): , Max: 97.7 (01-28-24 @ 13:22)    =======================================================  Current Antibiotics:    Other medications:  atorvastatin 10 milliGRAM(s) Oral at bedtime  chlorhexidine 2% Cloths 1 Application(s) Topical daily  dextrose 50% Injectable 25 Gram(s) IV Push once  dextrose 50% Injectable 12.5 Gram(s) IV Push once  heparin   Injectable 5000 Unit(s) SubCutaneous every 8 hours  insulin glargine Injectable (LANTUS) 32 Unit(s) SubCutaneous every morning  insulin lispro (ADMELOG) corrective regimen sliding scale   SubCutaneous three times a day before meals  insulin lispro (ADMELOG) corrective regimen sliding scale   SubCutaneous at bedtime  nitroglycerin  Infusion 75 MICROgram(s)/Min IV Continuous <Continuous>  pantoprazole    Tablet 40 milliGRAM(s) Oral before breakfast      =======================================================  Labs:                        9.4    12.46 )-----------( 297      ( 28 Jan 2024 14:18 )             30.6     01-28    135  |  104  |  26<H>  ----------------------------<  249<H>  5.8<H>   |  20<L>  |  1.51<H>    Ca    8.7      28 Jan 2024 14:18  Phos  3.9     01-28  Mg     1.60     01-28    TPro  6.9  /  Alb  3.9  /  TBili  <0.2  /  DBili  x   /  AST  45<H>  /  ALT  33  /  AlkPhos  66  01-28      Creatinine: 1.51 mg/dL (01-28-24 @ 14:18)            WBC Count: 12.46 K/uL (01-28-24 @ 14:18)        Alkaline Phosphatase: 66 U/L (01-28-24 @ 14:18)  Alanine Aminotransferase (ALT/SGPT): 33 U/L (01-28-24 @ 14:18)  Aspartate Aminotransferase (AST/SGOT): 45 U/L (01-28-24 @ 14:18)  Bilirubin Total: <0.2 mg/dL (01-28-24 @ 14:18)

## 2024-01-28 NOTE — ED ADULT NURSE REASSESSMENT NOTE - NS ED NURSE REASSESS COMMENT FT1
Facilitator RN- Patient received from primary RN. Patient in notable resp distress. Facilitator RN- Patient received from primary RN. Patient in notable resp distress. Tachypnea noted. Respirations even and labored use of accessory muscles noted. Oxygen saturation WNL on NRB Unable to speak full complete sentences without difficulty. Airway open patent and unobstructed. RT at bedside for BIPAP placement. EKG performed. Verbal orders completed. Nitro titrated as per policy/ protocol. Labs drawn. Comfort and safety maintained. All current care needs met. Care plan continued Tracey CARLIN

## 2024-01-28 NOTE — ED PROVIDER NOTE - NSICDXPASTMEDICALHX_GEN_ALL_CORE_FT
PAST MEDICAL HISTORY:  BRCA1 gene mutation positive     Breast cancer s/p bilateral mastectomy    Carotid stenosis s/p right carotid endarterectomy    Cataract     DM (diabetes mellitus)     GERD (gastroesophageal reflux disease)     HLD (hyperlipidemia)     HTN (hypertension)

## 2024-01-28 NOTE — H&P ADULT - HISTORY OF PRESENT ILLNESS
Patient is a 78-year-old female with a past medical history of carotid stenosis s/p right carotid endarterectomy, HTN, HLD, DM, GERD, cataract s/p extraction, and breast cancer s/p bilateral mastectomy (BRCA positive) presenting with dizziness. Patient woke up this morning at 4:30 with dizziness and a jittery feeling in her chest. Patient called an ambulance and found to have a pulse in the 40s. Denies pain in her chest, difficulty breathing, vomiting, fevers on interview. Patient started metoprolol succinate 25 mg 2 weeks ago per cardiologist Dr. Daly. Patient states she took the medication last night. Patient endorses compliance with medications,     EKG in ED 1st showing junctional sanjeev HR 40s, 2nd EKG Sinus rhythm with 1st deg AV block Patient is a 78-year-old female with a past medical history of carotid stenosis s/p right carotid endarterectomy, HTN, HLD, DM, GERD, cataract s/p extraction, and breast cancer s/p bilateral mastectomy (BRCA positive) presenting with dizziness. Patient woke up this morning at 4:30 with dizziness and a jittery feeling in her chest. Patient called an ambulance and found to have a pulse in the 40s. Denies pain in her chest, difficulty breathing, vomiting, fevers on interview. Patient started metoprolol succinate 25 mg 2 weeks ago per cardiologist Dr. Daly. Patient states she took the medication last night. Patient endorses compliance with medications,     EKG in ED 1st showing junctional sanjeev HR 40s, 2nd EKG Sinus rhythm with 1st deg AV block, POCUS at bedside per ED attending demonstrating B lines B/L, SCr 1.51, K 5.8 s/p hyperkalemia protocol, Mg 1.6, trop 18-> 18

## 2024-01-28 NOTE — H&P ADULT - NSHPPHYSICALEXAM_GEN_ALL_CORE
GENERAL: Tachypneic, RR 30s, requiring BIPAP  HEAD:  Normocephalic  EYES: Sclera clear  ENT: Moist mucous membranes  NECK: Supple, No JVD  CHEST/LUNG: +Fine crackles at bases. Labored respirations  HEART: Regular rate and rhythm; No murmurs, rubs, or gallops  ABDOMEN: BSx4; Soft, nontender, nondistended  EXTREMITIES:  2+ Peripheral Pulses, brisk capillary refill. No clubbing, cyanosis, or edema  NERVOUS SYSTEM:  A&Ox3, no focal deficits   SKIN: No rashes or lesions

## 2024-01-28 NOTE — ED ADULT NURSE REASSESSMENT NOTE - NS ED NURSE REASSESS COMMENT FT1
Facilitator RN- Hand off report given to CCU RN No signs of acute distress noted. Patient stable for transport. Patient tolerating BIPAP settings well. Remains tachypneic but minimal use of accessory muscles noted. Oxygen saturation WNL on BIPAP Airway open patent and unobstructed. Comfort and safety maintained. All current care needs met. Care plan continued Tracey CARLIN Irregular

## 2024-01-28 NOTE — ED PROVIDER NOTE - CLINICAL SUMMARY MEDICAL DECISION MAKING FREE TEXT BOX
78-year-old female with a past medical history of hypertension, hyperlipidemia, diabetes, GERD presenting with dizziness Since waking up at 4:30 AM, jittery feeling in her chest but no pain, denies vomiting, abdominal pain, difficulty breathing. Patient has pulse between high 30s and high 40s. Most recent blood pressure was 142/64. EKG shows junctional rhythm. Labs, cardiac monitoring, cardiology consult.

## 2024-01-28 NOTE — ED PROVIDER NOTE - NSICDXPASTSURGICALHX_GEN_ALL_CORE_FT
PAST SURGICAL HISTORY:  S/P bilateral mastectomy     S/P carotid endarterectomy Right    S/P carpal tunnel release Right 2006, left 2007    S/P cataract extraction     S/P hysterectomy with oophorectomy     S/P rotator cuff repair Bilateral    S/P tonsillectomy

## 2024-01-28 NOTE — ED ADULT NURSE NOTE - OBJECTIVE STATEMENT
Facilitator RN- Patient received in stretcher. AOX4. Respirations even and unlabored. Spontaneous movement of all extremities noted. Presents to ER c/o " I felt dizzy with this weird feeling in my chest" Patient reports she was sleeping and was awoken from her sleep due to " I felt a very weird rush to my head and then my chest felt weird" Patient reports she then tried to get up from bed and felt sudden dizziness like she was going to pass out and loose her balance. Patient noted with rate as low as 30 on cardiac monitor. Placed on zoll. IVs placed. verbal orders completed. Endorsed to primary RN. Comfort and safety maintained. All current care needs met. Care plan continued Tracey CARLIN

## 2024-01-28 NOTE — CONSULT NOTE ADULT - SUBJECTIVE AND OBJECTIVE BOX
· HPI Objective Statement: 78-year-old female with a past medical history of hypertension, hyperlipidemia, diabetes, GERD presenting with dizziness. Patient woke up this morning at 4:30 with dizziness and a jittery feeling in her chest. Patient called an ambulance and found to have a pulse in the 40s. Denies pain in her chest, difficulty breathing, vomiting, fevers. Patient started metoprolol succinate 25 mg 2 weeks ago from [facility] due to HTN . Patient states he took the medication last night.      Negative cardiac cath 10/2020      In ER, pt presented with Junctional rhythm, which reversed after treatement of potassium of 5.8  pt was treated in ER with IVF, and then developed APE, requiring IV NTG and BIPAP     MPriLOSEC 20 mg oral delayed release capsule: 1 cap(s) orally once a day  · 	Fish Oil 1000 mg oral capsule: 1 cap(s) orally 2 times a day  · 	Cozaar 100 mg oral tablet: 1 tab(s) orally once a day  · 	Lipitor 10 mg oral tablet: 1 tab(s) orally once a day  · 	Aggrenox 25 mg-200 mg oral capsule, extended release: 1 cap(s) orally 2 times a day  · 	Basaglar KwikPen 100 units/mL subcutaneous solution: 30 unit(s) subcutaneous once a day  · 	NovoLOG FlexPen 100 units/mL injectable solution: sliding scale injectable once a day (at bedtime)  · 	Janumet 50 mg-1000 mg oral tablet: 1 tab(s) orally 2 times a day  	- Resume Janumet in 72 hours on Sunday, October 4, 2020.    MEDICATIONS  (PRN):          Allergies    No Known Allergies    Intolerances        Past Medical History:  BRCA1 gene mutation positive    Breast cancer  s/p bilateral mastectomy  Carotid stenosis  s/p right carotid endarterectomy  Cataract    DM (diabetes mellitus)    GERD (gastroesophageal reflux disease)    HLD (hyperlipidemia)    HTN (hypertension).    Past Surgical History:  S/P bilateral mastectomy    S/P carotid endarterectomy  Right  S/P carpal tunnel release  Right 2006, left 2007  S/P cataract extraction    S/P hysterectomy with oophorectomy    S/P rotator cuff repair  Bilateral  S/P tonsillectomy.      PHYSICAL EXAM:  Vital Signs Last 24 Hrs  T(C): 36.5 (28 Jan 2024 13:22), Max: 36.5 (28 Jan 2024 13:22)  T(F): 97.7 (28 Jan 2024 13:22), Max: 97.7 (28 Jan 2024 13:22)  HR: 63 (28 Jan 2024 18:00) (41 - 104)  BP: 152/54 (28 Jan 2024 18:00) (142/64 - 226/66)  BP(mean): 84 (28 Jan 2024 16:45) (84 - 87)  RR: 22 (28 Jan 2024 18:00) (18 - 28)  SpO2: 100% (28 Jan 2024 18:00) (90% - 100%)    Parameters below as of 28 Jan 2024 18:00  Patient On (Oxygen Delivery Method): BiPAP/CPAP    GENERAL: NAD, well-groomed, well-developed  HEAD:  Atraumatic, Normocephalic  EYES: EOMI, PERRLA, conjunctiva and sclera clear  NECK: Supple, No JVD, Normal thyroid  NERVOUS SYSTEM:  Alert & Oriented X3, Good concentration;  and symmetric  CHEST/LUNG: Right basilar rales No rhonchi, wheezing, or rubs  HEART: S1S2 regular, with II/VI To and From murmur left base, without , rub nor gallop  ABDOMEN: Soft, Nontender, Nondistended; Bowel sounds present  EXTREMITIES:  2+ Peripheral Pulses, No clubbing, cyanosis, or edema      LABS:                        9.4    12.46 )-----------( 297      ( 28 Jan 2024 14:18 )             30.6     28 Jan 2024 14:18    135    |  104    |  26     ----------------------------<  249    5.8     |  20     |  1.51     Ca    8.7        28 Jan 2024 14:18  Phos  3.9       28 Jan 2024 14:18  Mg     1.60      28 Jan 2024 14:18    TPro  6.9    /  Alb  3.9    /  TBili  <0.2   /  DBili  x      /  AST  45     /  ALT  33     /  AlkPhos  66     28 Jan 2024 14:18    PT/INR - ( 28 Jan 2024 14:18 )   PT: 10.6 sec;   INR: 0.94 ratio         PTT - ( 28 Jan 2024 14:18 )  PTT:31.8 sec  CAPILLARY BLOOD GLUCOSE      POCT Blood Glucose.: 171 mg/dL (28 Jan 2024 16:41)  POCT Blood Glucose.: 246 mg/dL (28 Jan 2024 13:26)  Troponin negative     ECG (repeat) Poor baseline, NSR, NSSTTW changed     TELEMETRY:    RADIOLOGY & ADDITIONAL TESTS: cxr- MICHELLE     Imaging Personally Reviewed:  [x ] YES     P/P transient sanjeev/junctional rhythm- ? Secondary to beta blocker?  Admit, monitor.   Avoid AVN blocking agents  Check TSH  2- APE- doubt ischemia.  Check ProBNP, echo , r/o ACS  Consider repeat ischemia eval given risk factor profile  3- leukocytosis- check UA      Due IV NTG and BiPap initiated in ER, pt to be admitted to CCU  Will follow with you       Care Discussed with Consultants/Other Providers:

## 2024-01-28 NOTE — ED ADULT NURSE NOTE - NSFALLUNIVINTERV_ED_ALL_ED
Bed/Stretcher in lowest position, wheels locked, appropriate side rails in place/Call bell, personal items and telephone in reach/Instruct patient to call for assistance before getting out of bed/chair/stretcher/Non-slip footwear applied when patient is off stretcher/[place] to call system/Physically safe environment - no spills, clutter or unnecessary equipment/Purposeful proactive rounding/Room/bathroom lighting operational, light cord in reach

## 2024-01-28 NOTE — ED PROVIDER NOTE - PHYSICAL EXAMINATION
General: Appears well and nontoxic  Mentation: AAO x 3  psych: mood appropriate  HEENT: airway patent, conjunctivae clear bilaterally  Resp: symmetric chest rise, no resp distress, breath sounds CTA bilaterally  Cardio: Bradycardia  GI: soft/nondistended/nontender  Neuro: sensation and motor function grossly intact  Skin: no cyanosis, no jaundice  MSK: normal movement of all extremities  Lymph/Vasc: no LE edema

## 2024-01-28 NOTE — ED ADULT TRIAGE NOTE - CHIEF COMPLAINT QUOTE
pt coming from home.  pt woke up feeling dizzy thi am.  upon EMS arrival, pt was found to be bradycardic.  Hx:  HTN,

## 2024-01-28 NOTE — ED PROVIDER NOTE - ATTENDING CONTRIBUTION TO CARE
79 yo F with h/o HTN who presents to the ED c/o dizziness and generalized weakness that began when she woke up this morning. The dizziness has since resolved but still c/o generalized weakness. Also feels a "jittery" sensation in her chest. Was found to be bradycardic by EMS. Pt denies any history of bradycardia. She was started on 25 mg metoprolol about 2 weeks ago for HTN, last dose was last night. No chest pain, SOB, syncope. EKG with bradycardia- possibly junctional rhythm. Plan for labs, CXR. possible admission for symptomatic bradycardia.

## 2024-01-29 ENCOUNTER — TRANSCRIPTION ENCOUNTER (OUTPATIENT)
Age: 79
End: 2024-01-29

## 2024-01-29 LAB
A1C WITH ESTIMATED AVERAGE GLUCOSE RESULT: 8.5 % — HIGH (ref 4–5.6)
ALBUMIN SERPL ELPH-MCNC: 3.9 G/DL — SIGNIFICANT CHANGE UP (ref 3.3–5)
ALP SERPL-CCNC: 67 U/L — SIGNIFICANT CHANGE UP (ref 40–120)
ALT FLD-CCNC: 29 U/L — SIGNIFICANT CHANGE UP (ref 4–33)
ANION GAP SERPL CALC-SCNC: 13 MMOL/L — SIGNIFICANT CHANGE UP (ref 7–14)
APPEARANCE UR: CLEAR — SIGNIFICANT CHANGE UP
AST SERPL-CCNC: 27 U/L — SIGNIFICANT CHANGE UP (ref 4–32)
BASOPHILS # BLD AUTO: 0.04 K/UL — SIGNIFICANT CHANGE UP (ref 0–0.2)
BASOPHILS NFR BLD AUTO: 0.3 % — SIGNIFICANT CHANGE UP (ref 0–2)
BILIRUB SERPL-MCNC: 0.4 MG/DL — SIGNIFICANT CHANGE UP (ref 0.2–1.2)
BILIRUB UR-MCNC: NEGATIVE — SIGNIFICANT CHANGE UP
BUN SERPL-MCNC: 21 MG/DL — SIGNIFICANT CHANGE UP (ref 7–23)
CALCIUM SERPL-MCNC: 9.5 MG/DL — SIGNIFICANT CHANGE UP (ref 8.4–10.5)
CHLORIDE SERPL-SCNC: 106 MMOL/L — SIGNIFICANT CHANGE UP (ref 98–107)
CO2 SERPL-SCNC: 22 MMOL/L — SIGNIFICANT CHANGE UP (ref 22–31)
COLOR SPEC: YELLOW — SIGNIFICANT CHANGE UP
CREAT ?TM UR-MCNC: 14 MG/DL — SIGNIFICANT CHANGE UP
CREAT SERPL-MCNC: 1.32 MG/DL — HIGH (ref 0.5–1.3)
DIFF PNL FLD: NEGATIVE — SIGNIFICANT CHANGE UP
EGFR: 42 ML/MIN/1.73M2 — LOW
EOSINOPHIL # BLD AUTO: 0.11 K/UL — SIGNIFICANT CHANGE UP (ref 0–0.5)
EOSINOPHIL NFR BLD AUTO: 0.8 % — SIGNIFICANT CHANGE UP (ref 0–6)
ESTIMATED AVERAGE GLUCOSE: 197 — SIGNIFICANT CHANGE UP
GLUCOSE BLDC GLUCOMTR-MCNC: 212 MG/DL — HIGH (ref 70–99)
GLUCOSE BLDC GLUCOMTR-MCNC: 245 MG/DL — HIGH (ref 70–99)
GLUCOSE BLDC GLUCOMTR-MCNC: 254 MG/DL — HIGH (ref 70–99)
GLUCOSE BLDC GLUCOMTR-MCNC: 437 MG/DL — HIGH (ref 70–99)
GLUCOSE SERPL-MCNC: 193 MG/DL — HIGH (ref 70–99)
GLUCOSE UR QL: 100 MG/DL
HCT VFR BLD CALC: 29.8 % — LOW (ref 34.5–45)
HCV AB S/CO SERPL IA: 0.45 S/CO — SIGNIFICANT CHANGE UP (ref 0–0.99)
HCV AB SERPL-IMP: SIGNIFICANT CHANGE UP
HGB BLD-MCNC: 9.8 G/DL — LOW (ref 11.5–15.5)
IANC: 9.06 K/UL — HIGH (ref 1.8–7.4)
IMM GRANULOCYTES NFR BLD AUTO: 0.4 % — SIGNIFICANT CHANGE UP (ref 0–0.9)
KETONES UR-MCNC: NEGATIVE MG/DL — SIGNIFICANT CHANGE UP
LEUKOCYTE ESTERASE UR-ACNC: NEGATIVE — SIGNIFICANT CHANGE UP
LYMPHOCYTES # BLD AUTO: 2.79 K/UL — SIGNIFICANT CHANGE UP (ref 1–3.3)
LYMPHOCYTES # BLD AUTO: 21.5 % — SIGNIFICANT CHANGE UP (ref 13–44)
MAGNESIUM SERPL-MCNC: 2.1 MG/DL — SIGNIFICANT CHANGE UP (ref 1.6–2.6)
MCHC RBC-ENTMCNC: 27 PG — SIGNIFICANT CHANGE UP (ref 27–34)
MCHC RBC-ENTMCNC: 32.9 GM/DL — SIGNIFICANT CHANGE UP (ref 32–36)
MCV RBC AUTO: 82.1 FL — SIGNIFICANT CHANGE UP (ref 80–100)
MONOCYTES # BLD AUTO: 0.94 K/UL — HIGH (ref 0–0.9)
MONOCYTES NFR BLD AUTO: 7.2 % — SIGNIFICANT CHANGE UP (ref 2–14)
NEUTROPHILS # BLD AUTO: 9.06 K/UL — HIGH (ref 1.8–7.4)
NEUTROPHILS NFR BLD AUTO: 69.8 % — SIGNIFICANT CHANGE UP (ref 43–77)
NITRITE UR-MCNC: NEGATIVE — SIGNIFICANT CHANGE UP
NRBC # BLD: 0 /100 WBCS — SIGNIFICANT CHANGE UP (ref 0–0)
NRBC # FLD: 0 K/UL — SIGNIFICANT CHANGE UP (ref 0–0)
PH UR: 6.5 — SIGNIFICANT CHANGE UP (ref 5–8)
PHOSPHATE SERPL-MCNC: 4 MG/DL — SIGNIFICANT CHANGE UP (ref 2.5–4.5)
PLATELET # BLD AUTO: 269 K/UL — SIGNIFICANT CHANGE UP (ref 150–400)
POTASSIUM SERPL-MCNC: 4.7 MMOL/L — SIGNIFICANT CHANGE UP (ref 3.5–5.3)
POTASSIUM SERPL-SCNC: 4.7 MMOL/L — SIGNIFICANT CHANGE UP (ref 3.5–5.3)
PROT ?TM UR-MCNC: <4 MG/DL — SIGNIFICANT CHANGE UP
PROT SERPL-MCNC: 6.9 G/DL — SIGNIFICANT CHANGE UP (ref 6–8.3)
PROT UR-MCNC: NEGATIVE MG/DL — SIGNIFICANT CHANGE UP
PROT/CREAT UR-RTO: SIGNIFICANT CHANGE UP RATIO (ref 0–0.2)
RBC # BLD: 3.63 M/UL — LOW (ref 3.8–5.2)
RBC # FLD: 13.4 % — SIGNIFICANT CHANGE UP (ref 10.3–14.5)
SODIUM SERPL-SCNC: 141 MMOL/L — SIGNIFICANT CHANGE UP (ref 135–145)
SODIUM UR-SCNC: 119 MMOL/L — SIGNIFICANT CHANGE UP
SP GR SPEC: 1.01 — SIGNIFICANT CHANGE UP (ref 1–1.03)
UROBILINOGEN FLD QL: 0.2 MG/DL — SIGNIFICANT CHANGE UP (ref 0.2–1)
WBC # BLD: 12.99 K/UL — HIGH (ref 3.8–10.5)
WBC # FLD AUTO: 12.99 K/UL — HIGH (ref 3.8–10.5)

## 2024-01-29 PROCEDURE — 99291 CRITICAL CARE FIRST HOUR: CPT

## 2024-01-29 PROCEDURE — 93306 TTE W/DOPPLER COMPLETE: CPT | Mod: 26

## 2024-01-29 RX ORDER — HYDRALAZINE HCL 50 MG
25 TABLET ORAL THREE TIMES A DAY
Refills: 0 | Status: DISCONTINUED | OUTPATIENT
Start: 2024-01-29 | End: 2024-01-30

## 2024-01-29 RX ORDER — AMLODIPINE BESYLATE 2.5 MG/1
5 TABLET ORAL DAILY
Refills: 0 | Status: DISCONTINUED | OUTPATIENT
Start: 2024-01-29 | End: 2024-01-29

## 2024-01-29 RX ORDER — LOSARTAN POTASSIUM 100 MG/1
50 TABLET, FILM COATED ORAL DAILY
Refills: 0 | Status: DISCONTINUED | OUTPATIENT
Start: 2024-01-29 | End: 2024-01-30

## 2024-01-29 RX ORDER — INSULIN LISPRO 100/ML
1 VIAL (ML) SUBCUTANEOUS
Refills: 0 | Status: DISCONTINUED | OUTPATIENT
Start: 2024-01-29 | End: 2024-01-29

## 2024-01-29 RX ORDER — FUROSEMIDE 40 MG
40 TABLET ORAL
Refills: 0 | Status: DISCONTINUED | OUTPATIENT
Start: 2024-01-29 | End: 2024-01-30

## 2024-01-29 RX ORDER — AMLODIPINE BESYLATE 2.5 MG/1
5 TABLET ORAL ONCE
Refills: 0 | Status: COMPLETED | OUTPATIENT
Start: 2024-01-29 | End: 2024-01-29

## 2024-01-29 RX ORDER — ASPIRIN/CALCIUM CARB/MAGNESIUM 324 MG
81 TABLET ORAL DAILY
Refills: 0 | Status: DISCONTINUED | OUTPATIENT
Start: 2024-01-29 | End: 2024-01-30

## 2024-01-29 RX ORDER — HYDRALAZINE HCL 50 MG
50 TABLET ORAL THREE TIMES A DAY
Refills: 0 | Status: DISCONTINUED | OUTPATIENT
Start: 2024-01-29 | End: 2024-01-29

## 2024-01-29 RX ORDER — MAGNESIUM SULFATE 500 MG/ML
1 VIAL (ML) INJECTION ONCE
Refills: 0 | Status: COMPLETED | OUTPATIENT
Start: 2024-01-29 | End: 2024-01-29

## 2024-01-29 RX ORDER — INSULIN GLARGINE 100 [IU]/ML
38 INJECTION, SOLUTION SUBCUTANEOUS EVERY MORNING
Refills: 0 | Status: DISCONTINUED | OUTPATIENT
Start: 2024-01-29 | End: 2024-01-29

## 2024-01-29 RX ORDER — AMLODIPINE BESYLATE 2.5 MG/1
10 TABLET ORAL DAILY
Refills: 0 | Status: DISCONTINUED | OUTPATIENT
Start: 2024-01-29 | End: 2024-01-30

## 2024-01-29 RX ORDER — INSULIN GLARGINE 100 [IU]/ML
36 INJECTION, SOLUTION SUBCUTANEOUS EVERY MORNING
Refills: 0 | Status: DISCONTINUED | OUTPATIENT
Start: 2024-01-29 | End: 2024-01-30

## 2024-01-29 RX ORDER — LOSARTAN POTASSIUM 100 MG/1
50 TABLET, FILM COATED ORAL ONCE
Refills: 0 | Status: COMPLETED | OUTPATIENT
Start: 2024-01-29 | End: 2024-01-29

## 2024-01-29 RX ADMIN — LOSARTAN POTASSIUM 50 MILLIGRAM(S): 100 TABLET, FILM COATED ORAL at 13:56

## 2024-01-29 RX ADMIN — Medication 100 GRAM(S): at 21:42

## 2024-01-29 RX ADMIN — Medication 40 MILLIGRAM(S): at 05:01

## 2024-01-29 RX ADMIN — AMLODIPINE BESYLATE 5 MILLIGRAM(S): 2.5 TABLET ORAL at 04:53

## 2024-01-29 RX ADMIN — PANTOPRAZOLE SODIUM 40 MILLIGRAM(S): 20 TABLET, DELAYED RELEASE ORAL at 05:01

## 2024-01-29 RX ADMIN — CHLORHEXIDINE GLUCONATE 1 APPLICATION(S): 213 SOLUTION TOPICAL at 11:23

## 2024-01-29 RX ADMIN — HEPARIN SODIUM 5000 UNIT(S): 5000 INJECTION INTRAVENOUS; SUBCUTANEOUS at 13:57

## 2024-01-29 RX ADMIN — Medication 25 MILLIGRAM(S): at 21:42

## 2024-01-29 RX ADMIN — INSULIN GLARGINE 32 UNIT(S): 100 INJECTION, SOLUTION SUBCUTANEOUS at 08:23

## 2024-01-29 RX ADMIN — Medication 40 MILLIGRAM(S): at 14:05

## 2024-01-29 RX ADMIN — AMLODIPINE BESYLATE 5 MILLIGRAM(S): 2.5 TABLET ORAL at 11:13

## 2024-01-29 RX ADMIN — Medication 2: at 16:26

## 2024-01-29 RX ADMIN — HEPARIN SODIUM 5000 UNIT(S): 5000 INJECTION INTRAVENOUS; SUBCUTANEOUS at 21:42

## 2024-01-29 RX ADMIN — Medication 2: at 07:59

## 2024-01-29 RX ADMIN — Medication 1: at 21:46

## 2024-01-29 RX ADMIN — ATORVASTATIN CALCIUM 10 MILLIGRAM(S): 80 TABLET, FILM COATED ORAL at 21:42

## 2024-01-29 RX ADMIN — Medication 25 MILLIGRAM(S): at 13:57

## 2024-01-29 RX ADMIN — Medication 81 MILLIGRAM(S): at 11:13

## 2024-01-29 RX ADMIN — Medication 6: at 11:13

## 2024-01-29 RX ADMIN — HEPARIN SODIUM 5000 UNIT(S): 5000 INJECTION INTRAVENOUS; SUBCUTANEOUS at 05:01

## 2024-01-29 NOTE — PROGRESS NOTE ADULT - SUBJECTIVE AND OBJECTIVE BOX
CCU Service  Cardiology   Spect: [phone] ([facility])     GIULIANO WRIGHT is a 78y Female with a hx of carotid stenosis s/p R carotid endarterectomy, HTN, HLD, T2DM, GERD, cataract s/p extraction, and breast cancer s/p bilateral mastectomy (BRCA positive) who presents with SOB, found to have bradycardia and hypertensive emergency.    INTERVAL HX:  - weaned off BIPAP, now on NC 2L  - weaned off nicardipine gtt, on PO antihypertensives    Review of Systems: Focused ROS negative other that those stated above.    Allergies:  No Known Allergies    Medications:  amLODIPine   Tablet 5 milliGRAM(s) Oral daily  aspirin enteric coated 81 milliGRAM(s) Oral daily  atorvastatin 10 milliGRAM(s) Oral at bedtime  chlorhexidine 2% Cloths 1 Application(s) Topical daily  dextrose 50% Injectable 25 Gram(s) IV Push once  dextrose 50% Injectable 12.5 Gram(s) IV Push once  furosemide   Injectable 40 milliGRAM(s) IV Push two times a day  heparin   Injectable 5000 Unit(s) SubCutaneous every 8 hours  hydrALAZINE 25 milliGRAM(s) Oral three times a day  insulin glargine Injectable (LANTUS) 32 Unit(s) SubCutaneous every morning  insulin lispro (ADMELOG) corrective regimen sliding scale   SubCutaneous three times a day before meals  insulin lispro (ADMELOG) corrective regimen sliding scale   SubCutaneous at bedtime  pantoprazole    Tablet 40 milliGRAM(s) Oral before breakfast    Vitals:  T(C): 36.8 (01-29-24 @ 04:00), Max: 36.9 (01-28-24 @ 20:00)  HR: 61 (01-29-24 @ 06:00) (41 - 104)  BP: 126/52 (01-29-24 @ 06:00) (117/71 - 226/66)  RR: 16 (01-29-24 @ 06:00) (15 - 28)  SpO2: 100% (01-29-24 @ 06:00) (90% - 100%)  I/O's:    01-28-24 @ 07:01  -  01-29-24 @ 07:00  --------------------------------------------------------  IN: 131 mL / OUT: 1950 mL / NET: -1819 mL    Physical Exam:  CONSTITUTIONAL: Well-appearing, no apparent distress.  SKIN: No rashes or ecchymosis.  EYES: PERRLA. EOMI. No scleral icterus.  ENT: No JVD. Supple, no thyromegaly. No discharge or glossitis. Oral mucosa with moist membranes. Hearing intact bilaterally.  LYMPH: No lymphadenopathy.  CV: RRR. Normal S1 and S2. No murmurs, rubs, or gallops. No edema. Pulses equal bilaterally.  PULM: Clear to auscultation throughout. Respirations unlabored. No wheezing, rales, or rhonchi.  GI: Soft, non-tender. No palpable masses. No hematosplenomegaly. Normal abnormal bowel sounds.  MSK: Normal gait. No cyanosis or clubbing. Normal ROM. No spinal tenderness.  NEURO: CN II-XII intact. Strength 5/5 throughout. Sensation normal throughout. Reflexes +2 throughout.  PSYCH: A+O x3. Appropriate insight/judgment. Mood and affect appropriate. Recent/remote memory intact.    Labs:                        9.8    12.99 )-----------( 269      ( 29 Jan 2024 00:39 )             29.8     01-29    141  |  106  |  21  ----------------------------<  193<H>  4.7   |  22  |  1.32<H>    Ca    9.5      29 Jan 2024 00:39  Phos  4.0     01-29  Mg     2.10     01-29    TPro  6.9  /  Alb  3.9  /  TBili  0.4  /  DBili  x   /  AST  27  /  ALT  29  /  AlkPhos  67  01-29    TELEMETRY: NSR with junctional beats. HR ~60s    EKG:     Radiology/Procedures: Reviewed.   CCU Service  Cardiology   Spectra: [phone] ([facility])     GIULIANO WRIGHT is a 78y Female with a hx of carotid stenosis s/p R carotid endarterectomy, HTN, HLD, T2DM, GERD, cataract s/p extraction, and breast cancer s/p bilateral mastectomy (BRCA positive) who presents with SOB, found to have bradycardia and hypertensive emergency.    INTERVAL HX:  - weaned off BIPAP, now on NC 2L  - weaned off nitro gtt, on PO antihypertensives  - reports much improved breathing and no lightheadedness    Review of Systems: Focused ROS negative other that those stated above.    Allergies:  No Known Allergies    Medications:  amLODIPine   Tablet 5 milliGRAM(s) Oral daily  aspirin enteric coated 81 milliGRAM(s) Oral daily  atorvastatin 10 milliGRAM(s) Oral at bedtime  chlorhexidine 2% Cloths 1 Application(s) Topical daily  dextrose 50% Injectable 25 Gram(s) IV Push once  dextrose 50% Injectable 12.5 Gram(s) IV Push once  furosemide   Injectable 40 milliGRAM(s) IV Push two times a day  heparin   Injectable 5000 Unit(s) SubCutaneous every 8 hours  hydrALAZINE 25 milliGRAM(s) Oral three times a day  insulin glargine Injectable (LANTUS) 32 Unit(s) SubCutaneous every morning  insulin lispro (ADMELOG) corrective regimen sliding scale   SubCutaneous three times a day before meals  insulin lispro (ADMELOG) corrective regimen sliding scale   SubCutaneous at bedtime  pantoprazole    Tablet 40 milliGRAM(s) Oral before breakfast    Vitals:  T(C): 36.8 (01-29-24 @ 04:00), Max: 36.9 (01-28-24 @ 20:00)  HR: 61 (01-29-24 @ 06:00) (41 - 104)  BP: 126/52 (01-29-24 @ 06:00) (117/71 - 226/66)  RR: 16 (01-29-24 @ 06:00) (15 - 28)  SpO2: 100% (01-29-24 @ 06:00) (90% - 100%)  I/O's:    01-28-24 @ 07:01  -  01-29-24 @ 07:00  --------------------------------------------------------  IN: 131 mL / OUT: 1950 mL / NET: -1819 mL    Physical Exam:  CONSTITUTIONAL: no apparent distress.  SKIN: No rashes or ecchymosis.  EYES: PERRLA. EOMI. No scleral icterus.  ENT: Supple. No discharge or glossitis. Oral mucosa with moist membranes. Hearing intact bilaterally.  CV: RRR. Normal S1 and S2. No murmurs, rubs, or gallops. No edema. Pulses equal bilaterally.  PULM: Clear to auscultation throughout. Respirations unlabored. No wheezing, rales, or rhonchi.  GI: Soft, non-tender. No palpable masses.  MSK: No cyanosis or clubbing.  NEURO: CN grossly intact  PSYCH: A+O, appropriately communicating and responding to questions    Labs:                        9.8    12.99 )-----------( 269      ( 29 Jan 2024 00:39 )             29.8     01-29    141  |  106  |  21  ----------------------------<  193<H>  4.7   |  22  |  1.32<H>    Ca    9.5      29 Jan 2024 00:39  Phos  4.0     01-29  Mg     2.10     01-29    TPro  6.9  /  Alb  3.9  /  TBili  0.4  /  DBili  x   /  AST  27  /  ALT  29  /  AlkPhos  67  01-29    TELEMETRY: NSR with junctional beats. HR ~60s    EKG: Bradycardic with 1st degree AV block. No acute concerns    Radiology/Procedures: Reviewed.

## 2024-01-29 NOTE — DISCHARGE NOTE PROVIDER - PROVIDER TOKENS
PROVIDER:[TOKEN:[[id number]],FOLLOWUP:[2 weeks],ESTABLISHEDPATIENT:[T]] PROVIDER:[TOKEN:[[id number]],FOLLOWUP:[2 weeks],ESTABLISHEDPATIENT:[T]]

## 2024-01-29 NOTE — DISCHARGE NOTE PROVIDER - CARE PROVIDERS DIRECT ADDRESSES
[email or web address]

## 2024-01-29 NOTE — PROGRESS NOTE ADULT - ASSESSMENT
GIULIANO WRIGHT is a 78y Female with a hx of carotid stenosis s/p R carotid endarterectomy, HTN, HLD, T2DM, GERD, cataract s/p extraction, and breast cancer s/p bilateral mastectomy (BRCA positive) who presents with SOB, found to have bradycardia and hypertensive emergency.    NEURO:  AxO x3. No active issues.    CARDIOVASCULAR:  #Hypertensive Emergency    #HLD  - Continue Lipitor 10 mg daily    PULMONARY:  #SOB      GI:  #GERD  - Continue home PPI    Diet:     RENAL:      ENDO:      HEME/ONC/RHEUM:      ID:      SKIN/MSK:      ETHICS:  Full Code   GIULIANO WRIGHT is a 78y Female with a hx of carotid stenosis s/p R carotid endarterectomy, HTN, HLD, T2DM, GERD, cataract s/p extraction, and breast cancer s/p bilateral mastectomy (BRCA positive) who presents with SOB, found to have bradycardia and hypertensive emergency.    NEURO:  AxO x3. No active issues.    CARDIOVASCULAR:  #Hypertensive Emergency  Presented with /66 with dizziness and SOB concerning for flash pulmonary edema. POCUS with grossly preserved EF, LV>RV, and no appreciable pericardial effusion. Trp 18->18. BNP 2203. Responded well to nitroglycerin gtt @125mcg in ED with SBP down to 150s. S/p IV Lasix 40mg BID without adequate output.   - off nitro gtt  - transition Lasix to PO 40mg BID  - Hydralazine 25mg TID  - Amlodipine 10mg daily  - Tele  - TTE    #Bradycardia  #1st degree AV Block  Presented with HR in 40s with ECG demonstrating 1st degree AV block. No ST elevations or depression. Recently started on metoprolol succinate 25mg per her cardiologist Dr. Daly.   - hold metoprolol succinate 25mg qd  - Tele  - HR now ~60-70s    #HLD  - Continue Lipitor 10 mg daily    PULMONARY:  #SOB  #Flash pulmonary edema  Presented tachypneic with crackles bilaterally. CXR with pulmonary vascular congestion.   - Initiated on BiPAP, now weaned to NC  - wean oxygen as tolerated  - diuretics as above    GI:  #GERD  - Continue home Pantoprazole 40mg daily    Diet: DASH/Consistent Carb diet    RENAL:  #KARLA  Cre 1.51 on admission. No prior hx of kidney disease. BUN/Cr = 17.2 consistent with intrinsic etiology.   - hold home irbesartan 300mg daily   - FeNA, UPCR, UA  - Cre improving  - avoid nephrotoxic agents    #Hyperkalemia [resolved]  K 5.8 on admission s/p hyperkalemia protocol  - continue to monitor    ENDO:  #T2DM  - hold home Janumet while inpatient  - continue home Lantus 32u  - LDSS    HEME/ONC/RHEUM:  DVT ppx: SQ Heparin    ID:  #Leukocytosis  WBC 12.46 on admission. Afebrile and without clinical signs of infection. No infectious work-up at this time.    SKIN/MSK: N/A    ETHICS:  Full Code   GIULIANO WRIGHT is a 78y Female with a hx of carotid stenosis s/p R carotid endarterectomy, HTN, HLD, T2DM, GERD, cataract s/p extraction, and breast cancer s/p bilateral mastectomy (BRCA positive) who presents with SOB, found to have bradycardia and hypertensive emergency.    NEURO:  AxO x3. No active issues.    CARDIOVASCULAR:  #Hypertensive Emergency  Presented with /66 with dizziness, chest discomfort, and SOB concerning for flash pulmonary edema. POCUS with grossly preserved EF, LV>RV, and no appreciable pericardial effusion. Trp 18->18. BNP 2203. Responded well to nitroglycerin gtt @125mcg in ED with SBP down to 150s. S/p IV Lasix 40mg BID without adequate output.   - off nitro gtt  - transition Lasix to PO 40mg BID  - Hydralazine 25mg TID  - Amlodipine 10mg daily  - Tele  - TTE    #Bradycardia  #1st degree AV Block  Presented with HR in 40s with ECG demonstrating 1st degree AV block. No ST elevations or depression. Recently started on metoprolol succinate 25mg per her cardiologist Dr. Daly.   - hold metoprolol succinate 25mg qd  - Tele  - HR now ~60-70s    #HLD  - Continue Lipitor 10 mg daily    PULMONARY:  #SOB  #Flash pulmonary edema  Presented tachypneic with crackles bilaterally. CXR with pulmonary vascular congestion.   - Initiated on BiPAP, now weaned to NC  - wean oxygen as tolerated  - diuretics as above    GI:  #GERD  - Continue home Pantoprazole 40mg daily    Diet: DASH/Consistent Carb diet    RENAL:  #KARLA  Cre 1.51 on admission. No prior hx of kidney disease. BUN/Cr = 17.2 consistent with intrinsic etiology.   - hold home irbesartan 300mg daily   - FeNA, UPCR, UA  - Cre improving  - avoid nephrotoxic agents    #Hyperkalemia [resolved]  K 5.8 on admission s/p hyperkalemia protocol  - continue to monitor    ENDO:  #T2DM  - hold home Janumet while inpatient  - continue home Lantus 32u  - LDSS    HEME/ONC/RHEUM:  DVT ppx: SQ Heparin    ID:  #Leukocytosis  WBC 12.46 on admission. Afebrile and without clinical signs of infection. No infectious work-up at this time.    SKIN/MSK: N/A    ETHICS:  Full Code   GIULIANO WRIGHT is a 78y Female with a hx of carotid stenosis s/p R carotid endarterectomy, HTN, HLD, T2DM, GERD, cataract s/p extraction, and breast cancer s/p bilateral mastectomy (BRCA positive) who presents with SOB, found to have bradycardia and hypertensive emergency.    NEURO:  AxO x3. No active issues.    CARDIOVASCULAR:  #Hypertensive Emergency  Home meds: Amlodipine 10mg daily and irbesartan 300mg daily  Presented with /66 with dizziness, chest discomfort, and SOB concerning for flash pulmonary edema. POCUS with grossly preserved EF, LV>RV, and no appreciable pericardial effusion. Trp 18->18. BNP 2203. Responded well to nitroglycerin gtt @125mcg in ED with SBP down to 150s. S/p IV Lasix 40mg BID without adequate output.   - off nitro gtt  - transition Lasix to PO 40mg BID  - Hydralazine 25mg TID  - Amlodipine 5mg daily  - Tele  - TTE    #Bradycardia  #1st degree AV Block  Presented with HR in 40s with ECG demonstrating 1st degree AV block. No ST elevations or depression. Recently started on metoprolol succinate 25mg per her cardiologist Dr. Daly.   - hold metoprolol succinate 25mg qd  - Tele  - HR now ~60-70s    #HLD  - Continue Lipitor 10 mg daily    PULMONARY:  #SOB  #Flash pulmonary edema  Presented tachypneic with crackles bilaterally. CXR with pulmonary vascular congestion.   - Initiated on BiPAP, now weaned to NC  - wean oxygen as tolerated  - diuretics as above    GI:  #GERD  - Continue home Pantoprazole 40mg daily    Diet: DASH/Consistent Carb diet    RENAL:  #KARLA  Cre 1.51 on admission. No prior hx of kidney disease. BUN/Cr = 17.2 consistent with intrinsic etiology.   - hold home irbesartan 300mg daily   - FeNA, UPCR, UA  - Cre improving  - avoid nephrotoxic agents    #Hyperkalemia [resolved]  K 5.8 on admission s/p hyperkalemia protocol  - continue to monitor    ENDO:  #T2DM  - hold home Janumet while inpatient  - continue home Lantus 32u  - LDSS    HEME/ONC/RHEUM:  DVT ppx: SQ Heparin    ID:  #Leukocytosis  WBC 12.46 on admission. Afebrile and without clinical signs of infection. No infectious work-up at this time.    SKIN/MSK: N/A    ETHICS:  Full Code   GIULIANO WRIGHT is a 78y Female with a hx of carotid stenosis s/p R carotid endarterectomy, HTN, HLD, T2DM, GERD, cataract s/p extraction, and breast cancer s/p bilateral mastectomy (BRCA positive) who presents with SOB, found to have bradycardia and hypertensive emergency.    NEURO:  AxO x3. No active issues.    CARDIOVASCULAR:  #Hypertensive Emergency  Home meds: Amlodipine 10mg daily and irbesartan 300mg daily  Presented with /66 with dizziness, chest discomfort, and SOB concerning for flash pulmonary edema. POCUS with grossly preserved EF, LV>RV, and no appreciable pericardial effusion. Trp 18->18. BNP 2203. Responded well to nitroglycerin gtt @125mcg in ED with SBP down to 150s. S/p IV Lasix 40mg BID without adequate output.   - off nitro gtt  - transition Lasix to PO 40mg BID  - Hydralazine 25mg TID  - Amlodipine 5mg daily  - Tele  - TTE    #Bradycardia  #1st degree AV Block  Presented with HR in 40s with ECG demonstrating 1st degree AV block. No ST elevations or depression. Recently started on metoprolol succinate 25mg per her cardiologist Dr. Daly.   - hold metoprolol succinate 25mg qd  - Tele  - HR now ~60-70s    #HLD  - Continue Lipitor 10 mg daily    PULMONARY:  #SOB  #Flash pulmonary edema  Presented tachypneic with crackles bilaterally. CXR with pulmonary vascular congestion.   - Initiated on BiPAP, now weaned to NC  - wean oxygen as tolerated  - diuretics as above    GI:  #GERD  - Continue home Pantoprazole 40mg daily    Diet: DASH/Consistent Carb diet    RENAL:  #KARLA  Cre 1.51 on admission. No prior hx of kidney disease. BUN/Cr = 17.2 consistent with intrinsic etiology.   - hold home irbesartan 300mg daily   - FeNA, UPCR, UA  - Renal US  - Cre improving  - avoid nephrotoxic agents    #Hyperkalemia [resolved]  K 5.8 on admission s/p hyperkalemia protocol  - continue to monitor    ENDO:  #T2DM  - hold home Janumet while inpatient  - continue home Lantus 32u  - LDSS    HEME/ONC/RHEUM:  DVT ppx: SQ Heparin    ID:  #Leukocytosis  WBC 12.46 on admission. Afebrile and without clinical signs of infection. No infectious work-up at this time.    SKIN/MSK: N/A    ETHICS:  Full Code   GIULIANO WRIGHT is a 78y Female with a hx of carotid stenosis s/p R carotid endarterectomy, HTN, HLD, T2DM, GERD, cataract s/p extraction, and breast cancer s/p bilateral mastectomy (BRCA positive) who presents with SOB, found to have bradycardia and hypertensive emergency.    NEURO:  AxO x3. No active issues.    CARDIOVASCULAR:  #Hypertensive Emergency  Home meds: Amlodipine 10mg daily and irbesartan 300mg daily  Presented with /66 with dizziness, chest discomfort, and SOB concerning for flash pulmonary edema. POCUS with grossly preserved EF, LV>RV, and no appreciable pericardial effusion. Trp 18->18. BNP 2203. Responded well to nitroglycerin gtt @125mcg in ED with SBP down to 150s. S/p IV Lasix 40mg BID without adequate output.   - off nitro gtt  - transition Lasix to PO 40mg BID  - Hydralazine 25mg TID  - Amlodipine increase to home 10mg daily  - Tele  - TTE    #Bradycardia  #1st degree AV Block  Presented with HR in 40s with ECG demonstrating 1st degree AV block. No ST elevations or depression. Recently started on metoprolol succinate 25mg per her cardiologist Dr. Daly.   - hold metoprolol succinate 25mg qd  - Tele  - HR now ~60-70s    #HLD  - Continue Lipitor 10 mg daily    PULMONARY:  #SOB  #Flash pulmonary edema  Presented tachypneic with crackles bilaterally. CXR with pulmonary vascular congestion.   - Initiated on BiPAP, now weaned to NC  - wean oxygen as tolerated  - diuretics as above    GI:  #GERD  - Continue home Pantoprazole 40mg daily    Diet: DASH/Consistent Carb diet    RENAL:  #KARLA  Cre 1.51 on admission. No prior hx of kidney disease. BUN/Cr = 17.2 consistent with intrinsic etiology.   - hold home irbesartan 300mg daily   - FeNA, UPCR, UA  - Cre improving  - avoid nephrotoxic agents    #Hyperkalemia [resolved]  K 5.8 on admission s/p hyperkalemia protocol  - continue to monitor    ENDO:  #T2DM  - hold home Janumet while inpatient  - continue home Lantus 32u  - LDSS    HEME/ONC/RHEUM:  DVT ppx: SQ Heparin    ID:  #Leukocytosis  WBC 12.46 on admission. Afebrile and without clinical signs of infection. Monitor off abx at this time.  - UA    SKIN/MSK: N/A    ETHICS:  Full Code

## 2024-01-29 NOTE — DISCHARGE NOTE PROVIDER - NSDCFUADDAPPT_GEN_ALL_CORE_FT
APPTS ARE READY TO BE MADE: [x] YES    Best Family or Patient Contact (if needed):    Additional Information about above appointments (if needed):    1: Follow-up with Dr. Daly within 2 weeks  2:   3:     Other comments or requests:    APPTS ARE READY TO BE MADE: [x] YES    Best Family or Patient Contact (if needed):    Additional Information about above appointments (if needed):    1: Follow-up with Dr. Daly within 2 weeks  2:   3:   Provided patient with provider referral information, however patient prefers to schedule the appointments on their own.   Other comments or requests:    APPTS ARE READY TO BE MADE: [x] YES    Best Family or Patient Contact (if needed):    Additional Information about above appointments (if needed):    1: Follow-up with Dr. Daly within 2 weeks  2: Follow-up with endocrinology within 2 weeks  3:   Provided patient with provider referral information, however patient prefers to schedule the appointments on their own.   Other comments or requests:    APPTS ARE READY TO BE MADE: [x] YES    Best Family or Patient Contact (if needed):    Additional Information about above appointments (if needed):    1: Follow-up with Dr. Daly within 2 weeks  2: Follow-up with endocrinology within 2 weeks  3:   Provided patient with provider referral information, however patient prefers to schedule the appointments on their own.   Other comments or requests:   Pt is yash with Dr. Daly on 2/07 at 11:30am, [address] [facility], [address] location.

## 2024-01-29 NOTE — DISCHARGE NOTE PROVIDER - NSDCCPTREATMENT_GEN_ALL_CORE_FT
PRINCIPAL PROCEDURE  Procedure: Transthoracic echocardiography (TTE)  Findings and Treatment:   < end of copied text >  CONCLUSIONS:      1. Left ventricular cavity is normal. Left ventricular wall thickness is normal. Left ventricular systolic function is normal with an ejection fraction of 74 % by Jacobs's method of disks. There are no regional wall motion abnormalities seen.   2. Normal right ventricular cavity size and normal systolic function.   3. There is mild (grade 1) left ventricular diastolic dysfunction.   4. Structurally normal mitral valve with normal leaflet excursion. There is calcification of the mitral valve annulus. There is trace mitral regurgitation.< from: TTE W or WO Ultrasound Enhancing Agent (01.29.24 @ 09:28) >

## 2024-01-29 NOTE — DISCHARGE NOTE PROVIDER - CARE PROVIDER_API CALL
Christian Daly  Internal Medicine  [address] [place] [id number]  Phone: [phone]  Fax: [phone]  Established Patient  Follow Up Time: 2 weeks   Manuel Daly  Internal Medicine  [facility], [address] [address]  Phone: [phone]  Fax: [phone]  Established Patient  Follow Up Time: 2 weeks

## 2024-01-29 NOTE — PROGRESS NOTE ADULT - SUBJECTIVE AND OBJECTIVE BOX
Patient is a 78y old  Female who presents with a chief complaint of Flash pulmonary edema (29 Jan 2024 07:32)      INTERVAL HPI/OVERNIGHT EVENTS: weaned of NTG, comfortable or RA     MEDICATIONS  (STANDING):  amLODIPine   Tablet 5 milliGRAM(s) Oral daily  aspirin enteric coated 81 milliGRAM(s) Oral daily  atorvastatin 10 milliGRAM(s) Oral at bedtime  chlorhexidine 2% Cloths 1 Application(s) Topical daily  dextrose 50% Injectable 25 Gram(s) IV Push once  dextrose 50% Injectable 12.5 Gram(s) IV Push once  furosemide   Injectable 40 milliGRAM(s) IV Push two times a day  heparin   Injectable 5000 Unit(s) SubCutaneous every 8 hours  hydrALAZINE 25 milliGRAM(s) Oral three times a day  insulin glargine Injectable (LANTUS) 32 Unit(s) SubCutaneous every morning  insulin lispro (ADMELOG) corrective regimen sliding scale   SubCutaneous three times a day before meals  insulin lispro (ADMELOG) corrective regimen sliding scale   SubCutaneous at bedtime  pantoprazole    Tablet 40 milliGRAM(s) Oral before breakfast    MEDICATIONS  (PRN):            Allergies    No Known Allergies    Intolerances        REVIEW OF SYSTEMS:  CARDIOVASCULAR: No chest pain, palpitations, dizziness, or leg swelling; no shortness of breath     RESPIRATORY: No cough, wheezing, chills or hemoptysis; No shortness of breath    GASTROINTESTINAL: No abdominal or epigastric pain. No nausea, vomiting, or hematemesis; No diarrhea or constipation. No melena or hematochezia.    NEUROLOGICAL: No headaches, memory loss, loss of strength, numbness      PHYSICAL EXAM:  Vital Signs Last 24 Hrs  T(C): 36.9 (29 Jan 2024 08:00), Max: 36.9 (28 Jan 2024 20:00)  T(F): 98.4 (29 Jan 2024 08:00), Max: 98.5 (28 Jan 2024 20:00)  HR: 70 (29 Jan 2024 08:00) (41 - 104)  BP: 161/46 (29 Jan 2024 08:00) (117/71 - 226/66)  BP(mean): 79 (29 Jan 2024 08:00) (67 - 95)  RR: 15 (29 Jan 2024 08:00) (15 - 28)  SpO2: 100% (29 Jan 2024 08:00) (90% - 100%)    Parameters below as of 29 Jan 2024 08:00  Patient On (Oxygen Delivery Method): room air        GENERAL: NAD, well-groomed, well-developed  HEAD:  Atraumatic, Normocephalic  EYES: EOMI, PERRLA, conjunctiva and sclera clear  NECK: Supple, No JVD, Normal thyroid  NERVOUS SYSTEM:  Alert & Oriented X3, Good concentration;  and symmetric  CHEST/LUNG: Clear to auscultation bilaterally; No rales, rhonchi, wheezing, or rubs  HEART: S1S2 regular, with II/VI ANANYA LSB, without  rub nor gallop  ABDOMEN: Soft, Nontender, Nondistended; Bowel sounds present  EXTREMITIES:  2+ Peripheral Pulses, No clubbing, cyanosis, or edema      LABS:                        9.8    12.99 )-----------( 269      ( 29 Jan 2024 00:39 )             29.8     29 Jan 2024 00:39    141    |  106    |  21     ----------------------------<  193    4.7     |  22     |  1.32     Ca    9.5        29 Jan 2024 00:39  Phos  4.0       29 Jan 2024 00:39  Mg     2.10      29 Jan 2024 00:39    TPro  6.9    /  Alb  3.9    /  TBili  0.4    /  DBili  x      /  AST  27     /  ALT  29     /  AlkPhos  67     29 Jan 2024 00:39    PT/INR - ( 28 Jan 2024 14:18 )   PT: 10.6 sec;   INR: 0.94 ratio    pPro-Brain Natriuretic Peptide: 2282:      PTT - ( 28 Jan 2024 14:18 )  PTT:31.8 sec  CAPILLARY BLOOD GLUCOSE      POCT Blood Glucose.: 212 mg/dL (29 Jan 2024 07:25)  POCT Blood Glucose.: 206 mg/dL (28 Jan 2024 21:01)  POCT Blood Glucose.: 171 mg/dL (28 Jan 2024 16:41)  POCT Blood Glucose.: 246 mg/dL (28 Jan 2024 13:26)    ASSESSMENT    P/P transient sanjeev/junctional rhythm- ? Secondary to beta blocker?.   Avoid AVN blocking agents  Check TSH  2- APE- doubt ischemia. See ProBNP, echo , ACS ruled out   ?Diastolic dysfunction  Add Farxiga   Consider repeat ischemia eval given risk factor profile  3- leukocytosis- check UA    Will follow with you       Care Discussed with Consultants/Other Providers: House staff Patient is a 78y old  Female who presents with a chief complaint of Flash pulmonary edema (29 Jan 2024 07:32)      INTERVAL HPI/OVERNIGHT EVENTS: weaned of NTG, comfortable or RA     MEDICATIONS  (STANDING):  amLODIPine   Tablet 5 milliGRAM(s) Oral daily  aspirin enteric coated 81 milliGRAM(s) Oral daily  atorvastatin 10 milliGRAM(s) Oral at bedtime  chlorhexidine 2% Cloths 1 Application(s) Topical daily  dextrose 50% Injectable 25 Gram(s) IV Push once  dextrose 50% Injectable 12.5 Gram(s) IV Push once  furosemide   Injectable 40 milliGRAM(s) IV Push two times a day  heparin   Injectable 5000 Unit(s) SubCutaneous every 8 hours  hydrALAZINE 25 milliGRAM(s) Oral three times a day  insulin glargine Injectable (LANTUS) 32 Unit(s) SubCutaneous every morning  insulin lispro (ADMELOG) corrective regimen sliding scale   SubCutaneous three times a day before meals  insulin lispro (ADMELOG) corrective regimen sliding scale   SubCutaneous at bedtime  pantoprazole    Tablet 40 milliGRAM(s) Oral before breakfast    MEDICATIONS  (PRN):            Allergies    No Known Allergies    Intolerances        REVIEW OF SYSTEMS:  CARDIOVASCULAR: No chest pain, palpitations, dizziness, or leg swelling; no shortness of breath     RESPIRATORY: No cough, wheezing, chills or hemoptysis; No shortness of breath    GASTROINTESTINAL: No abdominal or epigastric pain. No nausea, vomiting, or hematemesis; No diarrhea or constipation. No melena or hematochezia.    NEUROLOGICAL: No headaches, memory loss, loss of strength, numbness      PHYSICAL EXAM:  Vital Signs Last 24 Hrs  T(C): 36.9 (29 Jan 2024 08:00), Max: 36.9 (28 Jan 2024 20:00)  T(F): 98.4 (29 Jan 2024 08:00), Max: 98.5 (28 Jan 2024 20:00)  HR: 70 (29 Jan 2024 08:00) (41 - 104)  BP: 161/46 (29 Jan 2024 08:00) (117/71 - 226/66)  BP(mean): 79 (29 Jan 2024 08:00) (67 - 95)  RR: 15 (29 Jan 2024 08:00) (15 - 28)  SpO2: 100% (29 Jan 2024 08:00) (90% - 100%)    Parameters below as of 29 Jan 2024 08:00  Patient On (Oxygen Delivery Method): room air        GENERAL: NAD, well-groomed, well-developed  HEAD:  Atraumatic, Normocephalic  EYES: EOMI, PERRLA, conjunctiva and sclera clear  NECK: Supple, No JVD, Normal thyroid  NERVOUS SYSTEM:  Alert & Oriented X3, Good concentration;  and symmetric  CHEST/LUNG: Clear to auscultation bilaterally; No rales, rhonchi, wheezing, or rubs  HEART: S1S2 regular, with II/VI ANANYA LSB, without  rub nor gallop  ABDOMEN: Soft, Nontender, Nondistended; Bowel sounds present  EXTREMITIES:  2+ Peripheral Pulses, No clubbing, cyanosis, or edema      LABS:                        9.8    12.99 )-----------( 269      ( 29 Jan 2024 00:39 )             29.8     29 Jan 2024 00:39    141    |  106    |  21     ----------------------------<  193    4.7     |  22     |  1.32     Ca    9.5        29 Jan 2024 00:39  Phos  4.0       29 Jan 2024 00:39  Mg     2.10      29 Jan 2024 00:39    TPro  6.9    /  Alb  3.9    /  TBili  0.4    /  DBili  x      /  AST  27     /  ALT  29     /  AlkPhos  67     29 Jan 2024 00:39    PT/INR - ( 28 Jan 2024 14:18 )   PT: 10.6 sec;   INR: 0.94 ratio    pPro-Brain Natriuretic Peptide: 2282:      PTT - ( 28 Jan 2024 14:18 )  PTT:31.8 sec  CAPILLARY BLOOD GLUCOSE      POCT Blood Glucose.: 212 mg/dL (29 Jan 2024 07:25)  POCT Blood Glucose.: 206 mg/dL (28 Jan 2024 21:01)  POCT Blood Glucose.: 171 mg/dL (28 Jan 2024 16:41)  POCT Blood Glucose.: 246 mg/dL (28 Jan 2024 13:26)    ASSESSMENT    P/P transient sanjeev/junctional rhythm- ? Secondary to beta blocker?.   Avoid AVN blocking agents  Check TSH  2- APE- doubt ischemia. See ProBNP, echo , ACS ruled out   ?Diastolic dysfunction    Add Farxiga   Consider repeat ischemia eval given risk factor profile  3- leukocytosis- check UA  4- advance amlodipine to 10 mgs., continue hydralizine   Will follow with you       Care Discussed with Consultants/Other Providers: House staff

## 2024-01-29 NOTE — DISCHARGE NOTE PROVIDER - NSDCCPCAREPLAN_GEN_ALL_CORE_FT
PRINCIPAL DISCHARGE DIAGNOSIS  Diagnosis: Bradycardia  Assessment and Plan of Treatment: You initially presented with a low heartrate. An EKG demonstrated an arrhythmia called 1st degree atrioventricular block. No intervention is required for this arrythmia, but we did stop your metoprolol. Please follow-up with your cardiologist.  .  If you develop chest pain, lightheadedness, or shortness of breath, please return to the hospital.      SECONDARY DISCHARGE DIAGNOSES  Diagnosis: Hypertensive emergency  Assessment and Plan of Treatment: You were found to have very high blood pressures above 200 in the ED which required IV antihypertensives. After controlling the blood pressure, you were tranisitoned to oral medications. Please continue taking Amlodipine 10mg and Irbesartan 300mg daily. Please follow-up with your cardiologist.    Diagnosis: KARLA (acute kidney injury)  Assessment and Plan of Treatment: You were found to have an acute kidney injury likely due to the very high blood pressures and diuretics. We stopped the diuretics and gave you some IV fluids. Please follow-up with your PCP.    Diagnosis: T2DM (type 2 diabetes mellitus)  Assessment and Plan of Treatment: Your sugars were high during your hospitalization, which required an increase in your Lantus to 36u daily. Please continue to take Lantus 36u daily and Janumet as previously prescribed. Please follow-up with your endocrinologist.

## 2024-01-29 NOTE — DISCHARGE NOTE PROVIDER - NSDCHHENCOUNTER_GEN_ALL_CORE
Order was written/H&P was completed/Induction / Augmentation was discussed/FHR was reviewed/Contractions pattern was reviewed 30-Jan-2024

## 2024-01-29 NOTE — DISCHARGE NOTE PROVIDER - NSDCMRMEDTOKEN_GEN_ALL_CORE_FT
Aggrenox 25 mg-200 mg oral capsule, extended release: 1 cap(s) orally 2 times a day  amLODIPine 10 mg oral tablet: 1 tab(s) orally once a day  Discharge Instructions: 1. Follow up with Dr. Daly within 1-2 weeks. Call to make an appointment.  2. Resume Janumet in 72 hours on Sunday, October 4, 2020. Continue your other medications as prescribed.  irbesartan 300 mg oral tablet: 1 tab(s) orally once a day  Janumet 50 mg-1000 mg oral tablet: 1 tab(s) orally 2 times a day  - Resume Janumet in 72 hours on Sunday, October 4, 2020.  Lantus 100 units/mL subcutaneous solution: 32 unit(s) subcutaneous once a day  Lipitor 10 mg oral tablet: 1 tab(s) orally once a day  PriLOSEC 20 mg oral delayed release capsule: 1 cap(s) orally once a day  Toprol-XL 25 mg oral tablet, extended release: 1 tab(s) orally once a day   Aggrenox 25 mg-200 mg oral capsule, extended release: 1 cap(s) orally 2 times a day  amLODIPine 10 mg oral tablet: 1 tab(s) orally once a day  Basaglar KwikPen 100 units/mL subcutaneous solution: 36 unit(s) subcutaneous once a day (at bedtime)  irbesartan 300 mg oral tablet: 1 tab(s) orally once a day  Janumet 50 mg-1000 mg oral tablet: 1 tab(s) orally 2 times a day  Lipitor 10 mg oral tablet: 1 tab(s) orally once a day  PriLOSEC 20 mg oral delayed release capsule: 1 cap(s) orally once a day

## 2024-01-29 NOTE — DISCHARGE NOTE PROVIDER - HOSPITAL COURSE
HPI:  Patient is a 78-year-old female with a past medical history of carotid stenosis s/p right carotid endarterectomy, HTN, HLD, DM, GERD, cataract s/p extraction, and breast cancer s/p bilateral mastectomy (BRCA positive) presenting with dizziness. Patient woke up this morning at 4:30 with dizziness and a jittery feeling in her chest. Patient called an ambulance and found to have a pulse in the 40s. Denies pain in her chest, difficulty breathing, vomiting, fevers on interview. Patient started metoprolol succinate 25 mg 2 weeks ago per cardiologist Dr. Daly. Patient states she took the medication last night. Patient endorses compliance with medications,     EKG in ED 1st showing junctional sanjeev HR 40s, 2nd EKG Sinus rhythm with 1st deg AV block, POCUS at bedside per ED attending demonstrating B lines B/L, SCr 1.51, K 5.8 s/p hyperkalemia protocol, Mg 1.6, trop 18-> 18 (28 Jan 2024 17:53)    Hospital Course:      Important Medication Changes and Reason:    Active or Pending Issues Requiring Follow-up:    Advanced Directives:   [x] Full code  [ ] DNR  [ ] Hospice    Discharge Diagnoses:  - Hypertensive Emergency  - KARLA   HPI:  Patient is a 78-year-old female with a past medical history of carotid stenosis s/p right carotid endarterectomy, HTN, HLD, DM, GERD, cataract s/p extraction, and breast cancer s/p bilateral mastectomy (BRCA positive) presenting with dizziness. Patient woke up this morning at 4:30 with dizziness and a jittery feeling in her chest. Patient called an ambulance and found to have a pulse in the 40s. Denies pain in her chest, difficulty breathing, vomiting, fevers on interview. Patient started metoprolol succinate 25 mg 2 weeks ago per cardiologist Dr. Daly. Patient states she took the medication last night. Patient endorses compliance with medications,     EKG in ED 1st showing junctional sanjeev HR 40s, 2nd EKG Sinus rhythm with 1st deg AV block, POCUS at bedside per ED attending demonstrating B lines B/L, SCr 1.51, K 5.8 s/p hyperkalemia protocol, Mg 1.6, trop 18-> 18 (28 Jan 2024 17:53)    Hospital Course:      Important Medication Changes and Reason:    Active or Pending Issues Requiring Follow-up:    Advanced Directives:   [x] Full code  [ ] DNR  [ ] Hospice    Discharge Diagnoses:  - 1st degree AV Block  - Hypertensive Emergency  - KARLA   HPI:  Patient is a 78-year-old female with a past medical history of carotid stenosis s/p right carotid endarterectomy, HTN, HLD, DM, GERD, cataract s/p extraction, and breast cancer s/p bilateral mastectomy (BRCA positive) presenting with dizziness. Patient woke up this morning at 4:30 with dizziness and a jittery feeling in her chest. Patient called an ambulance and found to have a pulse in the 40s. Denies pain in her chest, difficulty breathing, vomiting, fevers on interview. Patient started metoprolol succinate 25 mg 2 weeks ago per cardiologist Dr. Daly. Patient states she took the medication last night. Patient endorses compliance with medications,     EKG in ED 1st showing junctional sanjeev HR 40s, 2nd EKG Sinus rhythm with 1st deg AV block, POCUS at bedside per ED attending demonstrating B lines B/L, SCr 1.51, K 5.8 s/p hyperkalemia protocol, Mg 1.6, trop 18-> 18 (28 Jan 2024 17:53)    Hospital Course:  GIULIANO WRIGHT is a 78y Female with a hx of carotid stenosis s/p R carotid endarterectomy, HTN, HLD, T2DM, GERD, cataract s/p extraction, and breast cancer s/p bilateral mastectomy (BRCA positive) who presents with SOB, found to have 1st degree AV block and hypertensive emergency. Discontinued home metoprolol in setting of bradycardia and AV block. BP responded appropriately to nitro gtt, now weaned off and optimized with PO antihypertensives. Course complicated by KARLA likely multifactorial etiology of prerenal and intrinsic. Discontinued lasix and given IVF. Patient to follow-up with Dr. Daly for BP monitoring and repeat labwork. BP within reasonable limits on home amlodipine 10mg and irbesartan 300mg daily.     Important Medication Changes and Reason:  - discontinue metoprolol  - increased Lantus to 36u at night    Active or Pending Issues Requiring Follow-up:  - Follow-up with Dr. Daly within 2 weeks for BP and kidney fx monitoring  - Follow-up with Endocrinology    Advanced Directives:   [x] Full code  [ ] DNR  [ ] Hospice    Discharge Diagnoses:  - 1st degree AV Block  - Hypertensive Emergency  - KARLA

## 2024-01-30 ENCOUNTER — TRANSCRIPTION ENCOUNTER (OUTPATIENT)
Age: 79
End: 2024-01-30

## 2024-01-30 VITALS
RESPIRATION RATE: 18 BRPM | HEART RATE: 63 BPM | TEMPERATURE: 98 F | DIASTOLIC BLOOD PRESSURE: 70 MMHG | SYSTOLIC BLOOD PRESSURE: 134 MMHG

## 2024-01-30 LAB
ALBUMIN SERPL ELPH-MCNC: 3.8 G/DL — SIGNIFICANT CHANGE UP (ref 3.3–5)
ALP SERPL-CCNC: 64 U/L — SIGNIFICANT CHANGE UP (ref 40–120)
ALT FLD-CCNC: 19 U/L — SIGNIFICANT CHANGE UP (ref 4–33)
ANION GAP SERPL CALC-SCNC: 16 MMOL/L — HIGH (ref 7–14)
AST SERPL-CCNC: 20 U/L — SIGNIFICANT CHANGE UP (ref 4–32)
BASOPHILS # BLD AUTO: 0.03 K/UL — SIGNIFICANT CHANGE UP (ref 0–0.2)
BASOPHILS NFR BLD AUTO: 0.3 % — SIGNIFICANT CHANGE UP (ref 0–2)
BILIRUB SERPL-MCNC: 0.3 MG/DL — SIGNIFICANT CHANGE UP (ref 0.2–1.2)
BUN SERPL-MCNC: 25 MG/DL — HIGH (ref 7–23)
CALCIUM SERPL-MCNC: 9 MG/DL — SIGNIFICANT CHANGE UP (ref 8.4–10.5)
CHLORIDE SERPL-SCNC: 97 MMOL/L — LOW (ref 98–107)
CO2 SERPL-SCNC: 24 MMOL/L — SIGNIFICANT CHANGE UP (ref 22–31)
CREAT SERPL-MCNC: 1.41 MG/DL — HIGH (ref 0.5–1.3)
EGFR: 38 ML/MIN/1.73M2 — LOW
EOSINOPHIL # BLD AUTO: 0.16 K/UL — SIGNIFICANT CHANGE UP (ref 0–0.5)
EOSINOPHIL NFR BLD AUTO: 1.8 % — SIGNIFICANT CHANGE UP (ref 0–6)
GLUCOSE BLDC GLUCOMTR-MCNC: 200 MG/DL — HIGH (ref 70–99)
GLUCOSE BLDC GLUCOMTR-MCNC: 276 MG/DL — HIGH (ref 70–99)
GLUCOSE BLDC GLUCOMTR-MCNC: 396 MG/DL — HIGH (ref 70–99)
GLUCOSE SERPL-MCNC: 191 MG/DL — HIGH (ref 70–99)
HCT VFR BLD CALC: 29.9 % — LOW (ref 34.5–45)
HGB BLD-MCNC: 10 G/DL — LOW (ref 11.5–15.5)
IANC: 5.76 K/UL — SIGNIFICANT CHANGE UP (ref 1.8–7.4)
IMM GRANULOCYTES NFR BLD AUTO: 0.2 % — SIGNIFICANT CHANGE UP (ref 0–0.9)
LYMPHOCYTES # BLD AUTO: 2.24 K/UL — SIGNIFICANT CHANGE UP (ref 1–3.3)
LYMPHOCYTES # BLD AUTO: 25.3 % — SIGNIFICANT CHANGE UP (ref 13–44)
MAGNESIUM SERPL-MCNC: 2.1 MG/DL — SIGNIFICANT CHANGE UP (ref 1.6–2.6)
MCHC RBC-ENTMCNC: 27 PG — SIGNIFICANT CHANGE UP (ref 27–34)
MCHC RBC-ENTMCNC: 33.4 GM/DL — SIGNIFICANT CHANGE UP (ref 32–36)
MCV RBC AUTO: 80.6 FL — SIGNIFICANT CHANGE UP (ref 80–100)
MONOCYTES # BLD AUTO: 0.65 K/UL — SIGNIFICANT CHANGE UP (ref 0–0.9)
MONOCYTES NFR BLD AUTO: 7.3 % — SIGNIFICANT CHANGE UP (ref 2–14)
NEUTROPHILS # BLD AUTO: 5.76 K/UL — SIGNIFICANT CHANGE UP (ref 1.8–7.4)
NEUTROPHILS NFR BLD AUTO: 65.1 % — SIGNIFICANT CHANGE UP (ref 43–77)
NRBC # BLD: 0 /100 WBCS — SIGNIFICANT CHANGE UP (ref 0–0)
NRBC # FLD: 0 K/UL — SIGNIFICANT CHANGE UP (ref 0–0)
PHOSPHATE SERPL-MCNC: 4.9 MG/DL — HIGH (ref 2.5–4.5)
PLATELET # BLD AUTO: 295 K/UL — SIGNIFICANT CHANGE UP (ref 150–400)
POTASSIUM SERPL-MCNC: 4.1 MMOL/L — SIGNIFICANT CHANGE UP (ref 3.5–5.3)
POTASSIUM SERPL-SCNC: 4.1 MMOL/L — SIGNIFICANT CHANGE UP (ref 3.5–5.3)
PROT SERPL-MCNC: 7 G/DL — SIGNIFICANT CHANGE UP (ref 6–8.3)
RBC # BLD: 3.71 M/UL — LOW (ref 3.8–5.2)
RBC # FLD: 13.2 % — SIGNIFICANT CHANGE UP (ref 10.3–14.5)
SODIUM SERPL-SCNC: 137 MMOL/L — SIGNIFICANT CHANGE UP (ref 135–145)
T4 AB SER-ACNC: 9.95 UG/DL — SIGNIFICANT CHANGE UP (ref 5.1–13)
TSH SERPL-MCNC: 5.98 UIU/ML — HIGH (ref 0.27–4.2)
WBC # BLD: 8.86 K/UL — SIGNIFICANT CHANGE UP (ref 3.8–10.5)
WBC # FLD AUTO: 8.86 K/UL — SIGNIFICANT CHANGE UP (ref 3.8–10.5)

## 2024-01-30 PROCEDURE — 99291 CRITICAL CARE FIRST HOUR: CPT

## 2024-01-30 RX ORDER — METOPROLOL TARTRATE 50 MG
1 TABLET ORAL
Refills: 0 | DISCHARGE

## 2024-01-30 RX ORDER — SODIUM CHLORIDE 9 MG/ML
1000 INJECTION INTRAMUSCULAR; INTRAVENOUS; SUBCUTANEOUS
Refills: 0 | Status: DISCONTINUED | OUTPATIENT
Start: 2024-01-30 | End: 2024-01-30

## 2024-01-30 RX ORDER — INSULIN GLARGINE 100 [IU]/ML
36 INJECTION, SOLUTION SUBCUTANEOUS
Qty: 0 | Refills: 0 | DISCHARGE
Start: 2024-01-30

## 2024-01-30 RX ORDER — SITAGLIPTIN AND METFORMIN HYDROCHLORIDE 500; 50 MG/1; MG/1
1 TABLET, FILM COATED ORAL
Qty: 0 | Refills: 0 | DISCHARGE

## 2024-01-30 RX ORDER — INSULIN GLARGINE 100 [IU]/ML
32 INJECTION, SOLUTION SUBCUTANEOUS
Refills: 0 | DISCHARGE

## 2024-01-30 RX ADMIN — SODIUM CHLORIDE 75 MILLILITER(S): 9 INJECTION INTRAMUSCULAR; INTRAVENOUS; SUBCUTANEOUS at 09:56

## 2024-01-30 RX ADMIN — PANTOPRAZOLE SODIUM 40 MILLIGRAM(S): 20 TABLET, DELAYED RELEASE ORAL at 06:25

## 2024-01-30 RX ADMIN — Medication 1: at 07:38

## 2024-01-30 RX ADMIN — Medication 40 MILLIGRAM(S): at 06:27

## 2024-01-30 RX ADMIN — Medication 1 TABLET(S): at 11:21

## 2024-01-30 RX ADMIN — INSULIN GLARGINE 36 UNIT(S): 100 INJECTION, SOLUTION SUBCUTANEOUS at 07:40

## 2024-01-30 RX ADMIN — Medication 5: at 11:25

## 2024-01-30 RX ADMIN — HEPARIN SODIUM 5000 UNIT(S): 5000 INJECTION INTRAVENOUS; SUBCUTANEOUS at 06:25

## 2024-01-30 RX ADMIN — LOSARTAN POTASSIUM 50 MILLIGRAM(S): 100 TABLET, FILM COATED ORAL at 07:39

## 2024-01-30 RX ADMIN — AMLODIPINE BESYLATE 10 MILLIGRAM(S): 2.5 TABLET ORAL at 07:39

## 2024-01-30 RX ADMIN — Medication 3: at 16:34

## 2024-01-30 RX ADMIN — Medication 25 MILLIGRAM(S): at 06:25

## 2024-01-30 RX ADMIN — Medication 81 MILLIGRAM(S): at 11:21

## 2024-01-30 RX ADMIN — CHLORHEXIDINE GLUCONATE 1 APPLICATION(S): 213 SOLUTION TOPICAL at 11:29

## 2024-01-30 NOTE — DISCHARGE NOTE NURSING/CASE MANAGEMENT/SOCIAL WORK - PATIENT PORTAL LINK FT
You can access the FollowMyHealth Patient Portal offered by [facility] by registering at the following website: [email or web address]. By joining [facility]’s FollowMyHealth portal, you will also be able to view your health information using other applications (apps) compatible with our system. Pulses equal bilaterally, no edema present.

## 2024-01-30 NOTE — PROGRESS NOTE ADULT - SUBJECTIVE AND OBJECTIVE BOX
Patient is a 78y old  Female who presents with a chief complaint of Flash pulmonary edema (30 Jan 2024 07:16)      INTERVAL HPI/OVERNIGHT EVENTS: none    MEDICATIONS  (STANDING):  amLODIPine   Tablet 10 milliGRAM(s) Oral daily  aspirin enteric coated 81 milliGRAM(s) Oral daily  atorvastatin 10 milliGRAM(s) Oral at bedtime  chlorhexidine 2% Cloths 1 Application(s) Topical daily  dextrose 50% Injectable 25 Gram(s) IV Push once  dextrose 50% Injectable 12.5 Gram(s) IV Push once  heparin   Injectable 5000 Unit(s) SubCutaneous every 8 hours  insulin glargine Injectable (LANTUS) 36 Unit(s) SubCutaneous every morning  insulin lispro (ADMELOG) corrective regimen sliding scale   SubCutaneous at bedtime  insulin lispro (ADMELOG) corrective regimen sliding scale   SubCutaneous three times a day before meals  losartan 50 milliGRAM(s) Oral daily  multivitamin 1 Tablet(s) Oral daily  pantoprazole    Tablet 40 milliGRAM(s) Oral before breakfast  sodium chloride 0.9%. 1000 milliLiter(s) (75 mL/Hr) IV Continuous <Continuous>    MEDICATIONS  (PRN):            Allergies    No Known Allergies    Intolerances        REVIEW OF SYSTEMS:  CARDIOVASCULAR: No chest pain, palpitations, dizziness, or leg swelling; no shortness of breath     RESPIRATORY: No cough, wheezing, chills or hemoptysis; No shortness of breath    GASTROINTESTINAL: No abdominal or epigastric pain. No nausea, vomiting, or hematemesis; No diarrhea or constipation. No melena or hematochezia.    NEUROLOGICAL: No headaches, memory loss, loss of strength, numbness      PHYSICAL EXAM:  Vital Signs Last 24 Hrs  T(C): 37 (30 Jan 2024 07:35), Max: 37 (30 Jan 2024 07:35)  T(F): 98.6 (30 Jan 2024 07:35), Max: 98.6 (30 Jan 2024 07:35)  HR: 69 (30 Jan 2024 09:00) (61 - 87)  BP: 125/44 (30 Jan 2024 09:00) (102/88 - 171/53)  BP(mean): 67 (30 Jan 2024 09:00) (65 - 94)  RR: 17 (30 Jan 2024 09:00) (10 - 22)  SpO2: 96% (30 Jan 2024 09:00) (93% - 100%)    Parameters below as of 30 Jan 2024 08:00  Patient On (Oxygen Delivery Method): room air        GENERAL: NAD, well-groomed, well-developed  HEAD:  Atraumatic, Normocephalic  EYES: EOMI, PERRLA, conjunctiva and sclera clear  NECK: Supple, No JVD, Normal thyroid  NERVOUS SYSTEM:  Alert & Oriented X3, Good concentration;  and symmetric  CHEST/LUNG: Clear to auscultation bilaterally; No rales, rhonchi, wheezing, or rubs  HEART: S1S2 regular, with II/VI ANANYA LSB, without  rub nor gallop  ABDOMEN: Soft, Nontender, Nondistended; Bowel sounds present  EXTREMITIES:  2+ Peripheral Pulses, No clubbing, cyanosis, or edema      LABS:                        10.0   8.86  )-----------( 295      ( 30 Jan 2024 03:26 )             29.9     30 Jan 2024 03:26    137    |  97     |  25     ----------------------------<  191    4.1     |  24     |  1.41     Ca    9.0        30 Jan 2024 03:26  Phos  4.9       30 Jan 2024 03:26  Mg     2.10      30 Jan 2024 03:26    TPro  7.0    /  Alb  3.8    /  TBili  0.3    /  DBili  x      /  AST  20     /  ALT  19     /  AlkPhos  64     30 Jan 2024 03:26    PT/INR - ( 28 Jan 2024 14:18 )   PT: 10.6 sec;   INR: 0.94 ratio         PTT - ( 28 Jan 2024 14:18 )  PTT:31.8 sec  CAPILLARY BLOOD GLUCOSE      POCT Blood Glucose.: 200 mg/dL (30 Jan 2024 07:25)  POCT Blood Glucose.: 254 mg/dL (29 Jan 2024 21:40)  POCT Blood Glucose.: 245 mg/dL (29 Jan 2024 16:18)  POCT Blood Glucose.: 437 mg/dL (29 Jan 2024 11:04)      Acute diastolic dysfunction (? Due to agressive hydration in ER?)    Add Farxiga     3- leukocytosis- resolved  4- advance amlodipine to 10 mgs.,advance Losartan to 100  mild anemia stable   diabetic managmement   d/c planning     Patient is a 78y old  Female who presents with a chief complaint of Flash pulmonary edema (30 Jan 2024 07:16)      INTERVAL HPI/OVERNIGHT EVENTS: none    MEDICATIONS  (STANDING):  amLODIPine   Tablet 10 milliGRAM(s) Oral daily  aspirin enteric coated 81 milliGRAM(s) Oral daily  atorvastatin 10 milliGRAM(s) Oral at bedtime  chlorhexidine 2% Cloths 1 Application(s) Topical daily  dextrose 50% Injectable 25 Gram(s) IV Push once  dextrose 50% Injectable 12.5 Gram(s) IV Push once  heparin   Injectable 5000 Unit(s) SubCutaneous every 8 hours  insulin glargine Injectable (LANTUS) 36 Unit(s) SubCutaneous every morning  insulin lispro (ADMELOG) corrective regimen sliding scale   SubCutaneous at bedtime  insulin lispro (ADMELOG) corrective regimen sliding scale   SubCutaneous three times a day before meals  losartan 50 milliGRAM(s) Oral daily  multivitamin 1 Tablet(s) Oral daily  pantoprazole    Tablet 40 milliGRAM(s) Oral before breakfast  sodium chloride 0.9%. 1000 milliLiter(s) (75 mL/Hr) IV Continuous <Continuous>    MEDICATIONS  (PRN):            Allergies    No Known Allergies    Intolerances        REVIEW OF SYSTEMS:  CARDIOVASCULAR: No chest pain, palpitations, dizziness, or leg swelling; no shortness of breath     RESPIRATORY: No cough, wheezing, chills or hemoptysis; No shortness of breath    GASTROINTESTINAL: No abdominal or epigastric pain. No nausea, vomiting, or hematemesis; No diarrhea or constipation. No melena or hematochezia.    NEUROLOGICAL: No headaches, memory loss, loss of strength, numbness      PHYSICAL EXAM:  Vital Signs Last 24 Hrs  T(C): 37 (30 Jan 2024 07:35), Max: 37 (30 Jan 2024 07:35)  T(F): 98.6 (30 Jan 2024 07:35), Max: 98.6 (30 Jan 2024 07:35)  HR: 69 (30 Jan 2024 09:00) (61 - 87)  BP: 125/44 (30 Jan 2024 09:00) (102/88 - 171/53)  BP(mean): 67 (30 Jan 2024 09:00) (65 - 94)  RR: 17 (30 Jan 2024 09:00) (10 - 22)  SpO2: 96% (30 Jan 2024 09:00) (93% - 100%)    Parameters below as of 30 Jan 2024 08:00  Patient On (Oxygen Delivery Method): room air        GENERAL: NAD, well-groomed, well-developed  HEAD:  Atraumatic, Normocephalic  EYES: EOMI, PERRLA, conjunctiva and sclera clear  NECK: Supple, No JVD, Normal thyroid  NERVOUS SYSTEM:  Alert & Oriented X3, Good concentration;  and symmetric  CHEST/LUNG: Clear to auscultation bilaterally; No rales, rhonchi, wheezing, or rubs  HEART: S1S2 regular, with II/VI ANANYA LSB, without  rub nor gallop  ABDOMEN: Soft, Nontender, Nondistended; Bowel sounds present  EXTREMITIES:  2+ Peripheral Pulses, No clubbing, cyanosis, or edema      LABS:                        10.0   8.86  )-----------( 295      ( 30 Jan 2024 03:26 )             29.9     30 Jan 2024 03:26    137    |  97     |  25     ----------------------------<  191    4.1     |  24     |  1.41     Ca    9.0        30 Jan 2024 03:26  Phos  4.9       30 Jan 2024 03:26  Mg     2.10      30 Jan 2024 03:26    TPro  7.0    /  Alb  3.8    /  TBili  0.3    /  DBili  x      /  AST  20     /  ALT  19     /  AlkPhos  64     30 Jan 2024 03:26    PT/INR - ( 28 Jan 2024 14:18 )   PT: 10.6 sec;   INR: 0.94 ratio         PTT - ( 28 Jan 2024 14:18 )  PTT:31.8 sec  CAPILLARY BLOOD GLUCOSE      POCT Blood Glucose.: 200 mg/dL (30 Jan 2024 07:25)  POCT Blood Glucose.: 254 mg/dL (29 Jan 2024 21:40)  POCT Blood Glucose.: 245 mg/dL (29 Jan 2024 16:18)  POCT Blood Glucose.: 437 mg/dL (29 Jan 2024 11:04)      Acute diastolic dysfunction (? Due to agressive hydration in ER?)    Add Farxiga     3- leukocytosis- resolved  4- advance amlodipine to 10 mgs.,advance Losartan to 100  mild anemia stable   diabetic managmement  (A1C 8.5, can add Farxiga)   d/c planning

## 2024-01-30 NOTE — PROGRESS NOTE ADULT - CRITICAL CARE ATTENDING COMMENT
78 year old woman with history of HTN HLD Right CEA who presented with chest pressure and dyspnea in setting of hypertensive emergency and pulmonary edema.     TTE 1/29/24:  1. Left ventricular cavity is normal. Left ventricular wall thickness is normal. Left ventricular systolic  function is normal with an ejection fraction of 74 % by Jacobs's method of disks. There are no  regional wall motion abnormalities seen.  2. Normal right ventricular cavity size and normal systolic function.  3. There is mild (grade 1) left ventricular diastolic dysfunction.  4. Structurally normal mitral valve with normal leaflet excursion. There is calcification of the mitral valve  annulus. There is trace mitral regurgitation.    Meds:    Lasix 40 mg IV BID  Hydralazine 25 mg TID  Norvasc 5 mg daily    -Was started Losartan -would cont  -can hold lasix  -DC hydral, continue Amlodipine  -Monitor Cr
78 year old woman with history of HTN HLD Right CEA who presented with chest pressure and dyspnea in setting of hypertensive emergency and pulmonary edema.     Meds:  Nitro gtt (Off)  Lasix 40 mg IV BID  Hydralazine 25 mg TID  Norvasc 5 mg daily    -Followup TTE  -Continue Hydral, continue Amlodipine  -Monitor Cr

## 2024-01-30 NOTE — DISCHARGE NOTE NURSING/CASE MANAGEMENT/SOCIAL WORK - NSSCTYPOFSERV_GEN_ALL_CORE
Visiting Nurse Services: The 1st Visiting RN  Home Visit will be: Wednesday 1/31/23. The Visiting Nurse will call to arrange the Visit time.

## 2024-01-30 NOTE — DISCHARGE NOTE NURSING/CASE MANAGEMENT/SOCIAL WORK - NSDCFUADDAPPT_GEN_ALL_CORE_FT
APPTS ARE READY TO BE MADE: [x] YES    Best Family or Patient Contact (if needed):    Additional Information about above appointments (if needed):    1: Follow-up with Dr. Daly within 2 weeks  2:   3:   Provided patient with provider referral information, however patient prefers to schedule the appointments on their own.   Other comments or requests:

## 2024-01-30 NOTE — DISCHARGE NOTE NURSING/CASE MANAGEMENT/SOCIAL WORK - HAS THE PATIENT RECEIVED THE INFLUENZA VACCINE THIS SEASON?
35 y/o  LMP 7/15 presenting with vaginal bleeding and cramping w/ beta HCG of 416; possible early pregnancy vs miscarriage.    #Vaginal Bleeding  - beta   - TVUS demonstrating normal uterus, without adnexal masses  - H/H 11.7/34.1, vital signs stable; recommend T&S  - Likely early pregnancy vs miscarriage however given normal TVUS unable to r/o PUL. Recommend f/u in 48 hours for repeat bHCG  - Pt able to follow up with ED or with [facility] in 48 hrs    [facility]  Oncology [facility], [address] [address]    Pt seen and examined with service attending Dr Debbie Molina  PGY-2   yes...

## 2024-01-30 NOTE — DISCHARGE NOTE NURSING/CASE MANAGEMENT/SOCIAL WORK - NSSCNAMETXT_GEN_ALL_CORE
[facility] [phone]

## 2024-01-30 NOTE — PROGRESS NOTE ADULT - ASSESSMENT
GIULIANO WRIGHT is a 78y Female with a hx of carotid stenosis s/p R carotid endarterectomy, HTN, HLD, T2DM, GERD, cataract s/p extraction, and breast cancer s/p bilateral mastectomy (BRCA positive) who presents with SOB, found to have 1st degree AV block and hypertensive emergency. Responded appropriately to nitro gtt, now off. Titrating PO antihypertensives.    NEURO:  AxO x3. No active issues.    CARDIOVASCULAR:  #Hypertensive Emergency  Home meds: Amlodipine 10mg daily and irbesartan 300mg daily  Presented with /66 with dizziness, chest discomfort, and SOB concerning for flash pulmonary edema. POCUS with grossly preserved EF, LV>RV, and no appreciable pericardial effusion. Trp 18->18. BNP 2203. Responded well to nitroglycerin gtt @125mcg in ED with SBP down to 150s. S/p IV Lasix 40mg BID without adequate output.   - off nitro gtt  - TTE 1/29: EF 74% with normal LV function. No aortic insufficiency.  - Lasix PO 40mg BID  - Hydralazine 25mg TID  - continue home Amlodipine 10mg daily  - restart home irbesartan 300mg daily (therapeutic exchange = Losartan 100mg daily)  - Tele    #Bradycardia  #1st degree AV Block  Presented with HR in 40s with ECG demonstrating 1st degree AV block. No ST elevations or depression. Recently started on metoprolol succinate 25mg per her cardiologist Dr. Daly.   - hold metoprolol succinate 25mg qd  - Tele  - HR now ~60-70s    #HLD  - Continue Lipitor 10 mg daily    PULMONARY:  #SOB  #Flash pulmonary edema  Presented tachypneic with crackles bilaterally. CXR with pulmonary vascular congestion.   - Initiated on BiPAP, now weaned to NC  - wean oxygen as tolerated  - diuretics as above    GI:  #GERD  - Continue home Pantoprazole 40mg daily    Diet: DASH/Consistent Carb diet    RENAL:  #KARLA  Cr 1.51 on admission. No prior hx of kidney disease. BUN/Cr = 17.2 consistent with intrinsic etiology. UA negative.   - hold home irbesartan 300mg daily   - avoid nephrotoxic agents    #Hyperkalemia [resolved]  K 5.8 on admission s/p hyperkalemia protocol  - continue to monitor    ENDO:  #T2DM  Home med: Lantus 32u and Janumet  - hold home Janumet while inpatient  - Lantus 38u  - LDSS    HEME/ONC/RHEUM:  DVT ppx: SQ Heparin    ID:  #Leukocytosis  WBC 12.46 on admission. Afebrile and without clinical signs of infection. Monitor off abx at this time. UA neg.    SKIN/MSK: N/A    ETHICS:  Full Code

## 2024-01-30 NOTE — PROGRESS NOTE ADULT - SUBJECTIVE AND OBJECTIVE BOX
CCU Service  Cardiology   Spectra: [phone] ([facility])     GIULIANO WRIGHT is a 78y Female with a hx of carotid stenosis s/p R carotid endarterectomy, HTN, HLD, T2DM, GERD, cataract s/p extraction, and breast cancer s/p bilateral mastectomy (BRCA positive) who presents with SOB, found to have bradycardia and hypertensive emergency.    INTERVAL HX:  - no acute events overnight  - weaned to RA yday    Review of Systems: Focused ROS negative other that those stated above.    Allergies:  No Known Allergies    Medications:  amLODIPine   Tablet 5 milliGRAM(s) Oral daily  aspirin enteric coated 81 milliGRAM(s) Oral daily  atorvastatin 10 milliGRAM(s) Oral at bedtime  chlorhexidine 2% Cloths 1 Application(s) Topical daily  dextrose 50% Injectable 25 Gram(s) IV Push once  dextrose 50% Injectable 12.5 Gram(s) IV Push once  furosemide   Injectable 40 milliGRAM(s) IV Push two times a day  heparin   Injectable 5000 Unit(s) SubCutaneous every 8 hours  hydrALAZINE 25 milliGRAM(s) Oral three times a day  insulin glargine Injectable (LANTUS) 32 Unit(s) SubCutaneous every morning  insulin lispro (ADMELOG) corrective regimen sliding scale   SubCutaneous three times a day before meals  insulin lispro (ADMELOG) corrective regimen sliding scale   SubCutaneous at bedtime  pantoprazole    Tablet 40 milliGRAM(s) Oral before breakfast    Vitals:  T(C): 36.8 (01-29-24 @ 04:00), Max: 36.9 (01-28-24 @ 20:00)  HR: 61 (01-29-24 @ 06:00) (41 - 104)  BP: 126/52 (01-29-24 @ 06:00) (117/71 - 226/66)  RR: 16 (01-29-24 @ 06:00) (15 - 28)  SpO2: 100% (01-29-24 @ 06:00) (90% - 100%)  I/O's:    01-28-24 @ 07:01  -  01-29-24 @ 07:00  --------------------------------------------------------  IN: 131 mL / OUT: 1950 mL / NET: -1819 mL    Physical Exam:  CONSTITUTIONAL: no apparent distress.  SKIN: No rashes or ecchymosis.  EYES: PERRLA. EOMI. No scleral icterus.  ENT: Supple. No discharge or glossitis. Oral mucosa with moist membranes. Hearing intact bilaterally.  CV: RRR. Normal S1 and S2. No murmurs, rubs, or gallops. No edema. Pulses equal bilaterally.  PULM: Clear to auscultation throughout. Respirations unlabored. No wheezing, rales, or rhonchi.  GI: Soft, non-tender. No palpable masses.  MSK: No cyanosis or clubbing.  NEURO: CN grossly intact  PSYCH: A+O, appropriately communicating and responding to questions    Labs:                        10.0   8.86  )-----------( 295      ( 30 Jan 2024 03:26 )             29.9     01-30    137  |  97<L>  |  25<H>  ----------------------------<  191<H>  4.1   |  24  |  1.41<H>    Ca    9.0      30 Jan 2024 03:26  Phos  4.9     01-30  Mg     2.10     01-30    TPro  7.0  /  Alb  3.8  /  TBili  0.3  /  DBili  x   /  AST  20  /  ALT  19  /  AlkPhos  64  01-30    PT/INR - ( 28 Jan 2024 14:18 )   PT: 10.6 sec;   INR: 0.94 ratio    PTT - ( 28 Jan 2024 14:18 )  PTT:31.8 sec    TELEMETRY: NSR with junctional beats. HR ~60s    EKG: Bradycardic with 1st degree AV block. No acute concerns    Radiology/Procedures: Reviewed.  TTE 01.29.2024  CONCLUSIONS:   1. Left ventricular cavity is normal. Left ventricular wall thickness is normal. Left ventricular systolic function is normal with an ejection fraction of 74 % by Jacobs's method of disks. There are no regional wall motion abnormalities seen.   2. Normal right ventricular cavity size and normal systolic function.   3. There is mild (grade 1) left ventricular diastolic dysfunction.   4. Structurally normal mitral valve with normal leaflet excursion. There is calcification of the mitral valve annulus. There is trace mitral regurgitation.

## 2024-05-23 ENCOUNTER — EMERGENCY (EMERGENCY)
Facility: HOSPITAL | Age: 79
LOS: 1 days | Discharge: HOME | End: 2024-05-23
Attending: EMERGENCY MEDICINE | Admitting: EMERGENCY MEDICINE
Payer: MEDICARE

## 2024-05-23 VITALS
WEIGHT: 139.99 LBS | HEART RATE: 60 BPM | RESPIRATION RATE: 17 BRPM | TEMPERATURE: 98 F | SYSTOLIC BLOOD PRESSURE: 135 MMHG | DIASTOLIC BLOOD PRESSURE: 67 MMHG | OXYGEN SATURATION: 100 %

## 2024-05-23 DIAGNOSIS — Z90.89 ACQUIRED ABSENCE OF OTHER ORGANS: Chronic | ICD-10-CM

## 2024-05-23 DIAGNOSIS — Z90.13 ACQUIRED ABSENCE OF BILATERAL BREASTS AND NIPPLES: Chronic | ICD-10-CM

## 2024-05-23 DIAGNOSIS — Z90.710 ACQUIRED ABSENCE OF BOTH CERVIX AND UTERUS: Chronic | ICD-10-CM

## 2024-05-23 DIAGNOSIS — Z98.890 OTHER SPECIFIED POSTPROCEDURAL STATES: Chronic | ICD-10-CM

## 2024-05-23 DIAGNOSIS — Z98.49 CATARACT EXTRACTION STATUS, UNSPECIFIED EYE: Chronic | ICD-10-CM

## 2024-05-23 LAB
ALBUMIN SERPL ELPH-MCNC: 4.1 G/DL — SIGNIFICANT CHANGE UP (ref 3.3–5)
ALP SERPL-CCNC: 56 U/L — SIGNIFICANT CHANGE UP (ref 40–120)
ALT FLD-CCNC: 11 U/L — SIGNIFICANT CHANGE UP (ref 4–33)
ANION GAP SERPL CALC-SCNC: 13 MMOL/L — SIGNIFICANT CHANGE UP (ref 7–14)
AST SERPL-CCNC: 24 U/L — SIGNIFICANT CHANGE UP (ref 4–32)
BASOPHILS # BLD AUTO: 0.02 K/UL — SIGNIFICANT CHANGE UP (ref 0–0.2)
BASOPHILS NFR BLD AUTO: 0.2 % — SIGNIFICANT CHANGE UP (ref 0–2)
BILIRUB SERPL-MCNC: <0.2 MG/DL — SIGNIFICANT CHANGE UP (ref 0.2–1.2)
BUN SERPL-MCNC: 16 MG/DL — SIGNIFICANT CHANGE UP (ref 7–23)
CALCIUM SERPL-MCNC: 9.4 MG/DL — SIGNIFICANT CHANGE UP (ref 8.4–10.5)
CHLORIDE SERPL-SCNC: 103 MMOL/L — SIGNIFICANT CHANGE UP (ref 98–107)
CO2 SERPL-SCNC: 21 MMOL/L — LOW (ref 22–31)
CREAT SERPL-MCNC: 1.42 MG/DL — HIGH (ref 0.5–1.3)
EGFR: 38 ML/MIN/1.73M2 — LOW
EOSINOPHIL # BLD AUTO: 0.18 K/UL — SIGNIFICANT CHANGE UP (ref 0–0.5)
EOSINOPHIL NFR BLD AUTO: 1.9 % — SIGNIFICANT CHANGE UP (ref 0–6)
GLUCOSE SERPL-MCNC: 213 MG/DL — HIGH (ref 70–99)
HCT VFR BLD CALC: 33.3 % — LOW (ref 34.5–45)
HGB BLD-MCNC: 10.8 G/DL — LOW (ref 11.5–15.5)
IANC: 5.73 K/UL — SIGNIFICANT CHANGE UP (ref 1.8–7.4)
IMM GRANULOCYTES NFR BLD AUTO: 0.3 % — SIGNIFICANT CHANGE UP (ref 0–0.9)
LYMPHOCYTES # BLD AUTO: 2.85 K/UL — SIGNIFICANT CHANGE UP (ref 1–3.3)
LYMPHOCYTES # BLD AUTO: 30 % — SIGNIFICANT CHANGE UP (ref 13–44)
MAGNESIUM SERPL-MCNC: 1.7 MG/DL — SIGNIFICANT CHANGE UP (ref 1.6–2.6)
MCHC RBC-ENTMCNC: 26.3 PG — LOW (ref 27–34)
MCHC RBC-ENTMCNC: 32.4 GM/DL — SIGNIFICANT CHANGE UP (ref 32–36)
MCV RBC AUTO: 81 FL — SIGNIFICANT CHANGE UP (ref 80–100)
MONOCYTES # BLD AUTO: 0.69 K/UL — SIGNIFICANT CHANGE UP (ref 0–0.9)
MONOCYTES NFR BLD AUTO: 7.3 % — SIGNIFICANT CHANGE UP (ref 2–14)
NEUTROPHILS # BLD AUTO: 5.73 K/UL — SIGNIFICANT CHANGE UP (ref 1.8–7.4)
NEUTROPHILS NFR BLD AUTO: 60.3 % — SIGNIFICANT CHANGE UP (ref 43–77)
NRBC # BLD: 0 /100 WBCS — SIGNIFICANT CHANGE UP (ref 0–0)
NRBC # FLD: 0 K/UL — SIGNIFICANT CHANGE UP (ref 0–0)
NT-PROBNP SERPL-SCNC: 495 PG/ML — HIGH
PLATELET # BLD AUTO: 307 K/UL — SIGNIFICANT CHANGE UP (ref 150–400)
POTASSIUM SERPL-MCNC: 5.3 MMOL/L — SIGNIFICANT CHANGE UP (ref 3.5–5.3)
POTASSIUM SERPL-SCNC: 5.3 MMOL/L — SIGNIFICANT CHANGE UP (ref 3.5–5.3)
PROT SERPL-MCNC: 7.8 G/DL — SIGNIFICANT CHANGE UP (ref 6–8.3)
RBC # BLD: 4.11 M/UL — SIGNIFICANT CHANGE UP (ref 3.8–5.2)
RBC # FLD: 14.7 % — HIGH (ref 10.3–14.5)
SODIUM SERPL-SCNC: 137 MMOL/L — SIGNIFICANT CHANGE UP (ref 135–145)
TROPONIN T, HIGH SENSITIVITY RESULT: 15 NG/L — SIGNIFICANT CHANGE UP
WBC # BLD: 9.5 K/UL — SIGNIFICANT CHANGE UP (ref 3.8–10.5)
WBC # FLD AUTO: 9.5 K/UL — SIGNIFICANT CHANGE UP (ref 3.8–10.5)

## 2024-05-23 PROCEDURE — 71045 X-RAY EXAM CHEST 1 VIEW: CPT | Mod: 26

## 2024-05-23 PROCEDURE — 93010 ELECTROCARDIOGRAM REPORT: CPT

## 2024-05-23 PROCEDURE — 99285 EMERGENCY DEPT VISIT HI MDM: CPT

## 2024-05-23 NOTE — ED ADULT TRIAGE NOTE - CHIEF COMPLAINT QUOTE
Pt c/o midsternal non-radiating chest pain that started today at 2pm. Endorsing ROPER. Hx of CHF, T2DM  received 324 aspirin and o2. resting comfortably in triage.

## 2024-05-23 NOTE — ED ADULT TRIAGE NOTE - CCCP TRG CHIEF CMPLNT
chest pain Winlevi Pregnancy And Lactation Text: This medication is considered safe during pregnancy and breastfeeding.

## 2024-05-23 NOTE — ED PROVIDER NOTE - ATTENDING CONTRIBUTION TO CARE
GEN - NAD; well appearing; A+O x3   EYES- PERRL, EOMI  ENT: Airway patent   NECK: Neck supple  PULMONARY - CTA b/l, symmetric breath sounds.   CARDIAC -s1s2, RRR, no M,G,R  ABDOMEN - +BS, ND, NT, soft, no guarding, no rebound, no masses   BACK - no CVA tenderness, Normal  spine   EXTREMITIES - FROM, symmetric pulses, capillary refill < 2 seconds, no edema   SKIN - no rash or bruising   NEUROLOGIC - alert, speech clear, no focal deficits  agree with above hpi and mdm

## 2024-05-23 NOTE — ED PROVIDER NOTE - PHYSICAL EXAMINATION
Constitutional: VS reviewed. Alert and orientedx3, well appearing, no apparent distress  HEENT: Atraumatic, EOMI, PERRL   CV: RRR, no chest wall TTP   Lungs: Clear and equal bilaterally, no wheezes, rales or crackles  Abdomen: Soft, nondistended, nontender  MSK: No deformities  Skin: Warm and dry. As visualized no rashes, lesions, bruising or erythema  Neuro: Strength and sensation intact.   Lymph: No pitting edema in extremities.

## 2024-05-23 NOTE — ED PROVIDER NOTE - OBJECTIVE STATEMENT
78-year-old female with past medical history of carotid stenosis s/p right carotid endarterectomy, HTN, HLD, DM, GERD, double mastectomy and hysterectomy due to BRCA positive presents emergency department for 1 day of chest pain.  Patient endorses midsternal chest pain, pressure in nature radiating to the back around 2 PM today.  Patient states she was at rest when this started, not worse with exertion.  Patient called EMS and was given 324 of ASA and was placed on oxygen.  She did endorse some difficulty breathing at that time as well.  Patient now endorses improvement in her chest pressure and resolution of her shortness of breath.  Patient states she just feels fatigued at this time.  Patient denies fevers, chills, headache, vision changes, abdominal pain, nausea/vomiting, diarrhea/constipation, dysuria, hematuria.

## 2024-05-23 NOTE — ED PROVIDER NOTE - CLINICAL SUMMARY MEDICAL DECISION MAKING FREE TEXT BOX
78-year-old female with past medical history of carotid stenosis s/p right carotid endarterectomy, HTN, HLD, DM, GERD, double mastectomy and hysterectomy due to BRCA positive presents emergency department for 1 day of chest pain. Midsternal, pressure like, radiating to back. Now mostly resolved. Differentials include but not limited to ACS, GERD, MSK pain. Plan for labs, EKG, CXR. Dispo pending labs and imaging.

## 2024-05-23 NOTE — ED PROVIDER NOTE - PATIENT PORTAL LINK FT
You can access the FollowMyHealth Patient Portal offered by [facility] by registering at the following website: [email or web address]. By joining [employer]’s FollowMyHealth portal, you will also be able to view your health information using other applications (apps) compatible with our system.

## 2024-05-23 NOTE — ED PROVIDER NOTE - PROGRESS NOTE DETAILS
Cele Escobar PGY2: Labs non-actionable. Pt offered observation for further cardiac workup including stress and echo. Pt states she would like to go home and follow up outpatient with her cardiologist instead of staying. Pt okay for dc home at this time. Strict return precautions and DC instructions discussed. Questions answered. Pt to follow up with cardiologist and PCP.

## 2024-05-24 VITALS
TEMPERATURE: 98 F | DIASTOLIC BLOOD PRESSURE: 55 MMHG | SYSTOLIC BLOOD PRESSURE: 155 MMHG | HEART RATE: 70 BPM | RESPIRATION RATE: 18 BRPM | OXYGEN SATURATION: 100 %

## 2024-05-24 LAB — TROPONIN T, HIGH SENSITIVITY RESULT: 12 NG/L — SIGNIFICANT CHANGE UP

## 2024-05-24 NOTE — ED ADULT NURSE REASSESSMENT NOTE - NS ED NURSE REASSESS COMMENT FT1
break coverage rn. received report from RN carmen. pt A&Ox4, vitally stable. endorses partial improvement with chest pain. NSR on monitor satting 100% on room air. safety maintained. resp even unlabored, abd soft, pedal pulses 2+ bilaterally

## 2024-05-24 NOTE — ED ADULT NURSE NOTE - OBJECTIVE STATEMENT
no nurse note done from primary rn. pt A&Ox4, ambulatory. vitally stable. denies CP, SOB, nausea, vomiting, headache, dizziness. IV removed pt discharged by MD

## 2025-01-21 ENCOUNTER — EMERGENCY (EMERGENCY)
Facility: HOSPITAL | Age: 80
LOS: 1 days | Discharge: HOME | End: 2025-01-21
Attending: EMERGENCY MEDICINE | Admitting: EMERGENCY MEDICINE
Payer: MEDICARE

## 2025-01-21 VITALS
DIASTOLIC BLOOD PRESSURE: 43 MMHG | HEIGHT: 62 IN | RESPIRATION RATE: 18 BRPM | WEIGHT: 160.06 LBS | OXYGEN SATURATION: 100 % | SYSTOLIC BLOOD PRESSURE: 222 MMHG | HEART RATE: 78 BPM | TEMPERATURE: 98 F

## 2025-01-21 VITALS
RESPIRATION RATE: 18 BRPM | HEART RATE: 80 BPM | DIASTOLIC BLOOD PRESSURE: 58 MMHG | OXYGEN SATURATION: 99 % | TEMPERATURE: 98 F | SYSTOLIC BLOOD PRESSURE: 167 MMHG

## 2025-01-21 DIAGNOSIS — Z98.890 OTHER SPECIFIED POSTPROCEDURAL STATES: Chronic | ICD-10-CM

## 2025-01-21 DIAGNOSIS — Z98.49 CATARACT EXTRACTION STATUS, UNSPECIFIED EYE: Chronic | ICD-10-CM

## 2025-01-21 DIAGNOSIS — Z90.13 ACQUIRED ABSENCE OF BILATERAL BREASTS AND NIPPLES: Chronic | ICD-10-CM

## 2025-01-21 DIAGNOSIS — Z90.710 ACQUIRED ABSENCE OF BOTH CERVIX AND UTERUS: Chronic | ICD-10-CM

## 2025-01-21 DIAGNOSIS — Z90.89 ACQUIRED ABSENCE OF OTHER ORGANS: Chronic | ICD-10-CM

## 2025-01-21 LAB
ALBUMIN SERPL ELPH-MCNC: 4.4 G/DL — SIGNIFICANT CHANGE UP (ref 3.3–5)
ALP SERPL-CCNC: 55 U/L — SIGNIFICANT CHANGE UP (ref 40–120)
ALT FLD-CCNC: 7 U/L — SIGNIFICANT CHANGE UP (ref 4–33)
ANION GAP SERPL CALC-SCNC: 15 MMOL/L — HIGH (ref 7–14)
AST SERPL-CCNC: 15 U/L — SIGNIFICANT CHANGE UP (ref 4–32)
BASOPHILS # BLD AUTO: 0.04 K/UL — SIGNIFICANT CHANGE UP (ref 0–0.2)
BASOPHILS NFR BLD AUTO: 0.3 % — SIGNIFICANT CHANGE UP (ref 0–2)
BILIRUB SERPL-MCNC: 0.3 MG/DL — SIGNIFICANT CHANGE UP (ref 0.2–1.2)
BLOOD GAS VENOUS COMPREHENSIVE RESULT: SIGNIFICANT CHANGE UP
BUN SERPL-MCNC: 18 MG/DL — SIGNIFICANT CHANGE UP (ref 7–23)
CALCIUM SERPL-MCNC: 9 MG/DL — SIGNIFICANT CHANGE UP (ref 8.4–10.5)
CHLORIDE SERPL-SCNC: 107 MMOL/L — SIGNIFICANT CHANGE UP (ref 98–107)
CO2 SERPL-SCNC: 18 MMOL/L — LOW (ref 22–31)
CREAT SERPL-MCNC: 1.29 MG/DL — SIGNIFICANT CHANGE UP (ref 0.5–1.3)
EGFR: 42 ML/MIN/1.73M2 — LOW
EOSINOPHIL # BLD AUTO: 0.13 K/UL — SIGNIFICANT CHANGE UP (ref 0–0.5)
EOSINOPHIL NFR BLD AUTO: 1 % — SIGNIFICANT CHANGE UP (ref 0–6)
GAS PNL BLDV: SIGNIFICANT CHANGE UP
GLUCOSE SERPL-MCNC: 253 MG/DL — HIGH (ref 70–99)
HCT VFR BLD CALC: 31.3 % — LOW (ref 34.5–45)
HGB BLD-MCNC: 9.9 G/DL — LOW (ref 11.5–15.5)
IANC: 8.93 K/UL — HIGH (ref 1.8–7.4)
IMM GRANULOCYTES NFR BLD AUTO: 0.5 % — SIGNIFICANT CHANGE UP (ref 0–0.9)
LYMPHOCYTES # BLD AUTO: 2.89 K/UL — SIGNIFICANT CHANGE UP (ref 1–3.3)
LYMPHOCYTES # BLD AUTO: 22.2 % — SIGNIFICANT CHANGE UP (ref 13–44)
MCHC RBC-ENTMCNC: 26.4 PG — LOW (ref 27–34)
MCHC RBC-ENTMCNC: 31.6 G/DL — LOW (ref 32–36)
MCV RBC AUTO: 83.5 FL — SIGNIFICANT CHANGE UP (ref 80–100)
MONOCYTES # BLD AUTO: 0.93 K/UL — HIGH (ref 0–0.9)
MONOCYTES NFR BLD AUTO: 7.2 % — SIGNIFICANT CHANGE UP (ref 2–14)
NEUTROPHILS # BLD AUTO: 8.93 K/UL — HIGH (ref 1.8–7.4)
NEUTROPHILS NFR BLD AUTO: 68.8 % — SIGNIFICANT CHANGE UP (ref 43–77)
NRBC # BLD: 0 /100 WBCS — SIGNIFICANT CHANGE UP (ref 0–0)
NRBC # FLD: 0 K/UL — SIGNIFICANT CHANGE UP (ref 0–0)
PLATELET # BLD AUTO: 322 K/UL — SIGNIFICANT CHANGE UP (ref 150–400)
POTASSIUM SERPL-MCNC: 5.2 MMOL/L — SIGNIFICANT CHANGE UP (ref 3.5–5.3)
POTASSIUM SERPL-SCNC: 5.2 MMOL/L — SIGNIFICANT CHANGE UP (ref 3.5–5.3)
PROT SERPL-MCNC: 7.4 G/DL — SIGNIFICANT CHANGE UP (ref 6–8.3)
RBC # BLD: 3.75 M/UL — LOW (ref 3.8–5.2)
RBC # FLD: 14.3 % — SIGNIFICANT CHANGE UP (ref 10.3–14.5)
SODIUM SERPL-SCNC: 140 MMOL/L — SIGNIFICANT CHANGE UP (ref 135–145)
WBC # BLD: 12.99 K/UL — HIGH (ref 3.8–10.5)
WBC # FLD AUTO: 12.99 K/UL — HIGH (ref 3.8–10.5)

## 2025-01-21 PROCEDURE — 93010 ELECTROCARDIOGRAM REPORT: CPT

## 2025-01-21 PROCEDURE — 99285 EMERGENCY DEPT VISIT HI MDM: CPT

## 2025-01-21 RX ORDER — SODIUM CHLORIDE 9 MG/ML
500 INJECTION, SOLUTION INTRAMUSCULAR; INTRAVENOUS; SUBCUTANEOUS ONCE
Refills: 0 | Status: COMPLETED | OUTPATIENT
Start: 2025-01-21 | End: 2025-01-21

## 2025-01-21 RX ADMIN — SODIUM CHLORIDE 500 MILLILITER(S): 9 INJECTION, SOLUTION INTRAMUSCULAR; INTRAVENOUS; SUBCUTANEOUS at 12:14

## 2025-01-21 NOTE — ED PROVIDER NOTE - PROGRESS NOTE DETAILS
Patrick Blackwell PGY3 MD  patient given results- will be discharged with outpatient follow up. Strict return precautions given.

## 2025-01-21 NOTE — ED PROVIDER NOTE - NSFOLLOWUPINSTRUCTIONS_ED_ALL_ED_FT
You were seen in the ED for an abnormal lab result. You had blood work done here which showed that your potassium level was normal.  You were given some IV fluids.  Please return to the emergency department if you have new or worsening symptoms.  Please follow-up with your primary care doctor.

## 2025-01-21 NOTE — ED PROVIDER NOTE - OBJECTIVE STATEMENT
Patient is a 79-year-old female with a past medical history of hypertension who presents emergency department complaining of abnormal labs.  Patient states that she was getting outpatient lab work done as a routine to check for elevated white blood cells.  Patient states she received a phone call last night that her potassium was elevated.  Patient denies any chest pain, shortness of breath, palpitations, any other symptoms.  Of note, the patient states that she had 1 episode of nonbloody diarrhea this morning.  No subsequent episodes.  Denies any symptoms at this time.  Denies any history of similar sympto presentation.

## 2025-01-21 NOTE — ED ADULT NURSE NOTE - OBJECTIVE STATEMENT
Patient received to room 1, patient A&Ox4, english speaking, ambulatory, coming into the ed for complaints of high potassium on routine blood work outpatient. patient denies any chest pain, SOB, abdominal pain, dizziness, headache, lightheadedness, n/v, had one episode of diarrhea this morning, patient has history of HTN, HLD, DM, took insulin this morning and medication for hypertension, patient changed, placed on monitor, ekg done, IV, 20 G L AC, labs drawn, patient breathing even and unlabored, abdomen soft, non-distended, non-tender, skin warm, dry, intact, safety maintained, comfort measures provided.

## 2025-01-21 NOTE — ED PROVIDER NOTE - ATTENDING CONTRIBUTION TO CARE
[FreeTextEntry1] : \par 1.  HTN - BP is acceptable. Continue present management\par 2.  Aortic aneurysm - follows with cardiology who are following serially CAT scans.  Her last one was in May and was without change per patient.   Will send a copy of labs to cardiology when they are available\par 3.  COPD/Pulmonary nodules - Had a slight change in a pulmonary nodule from 6 mm to 8mm and was referred to pulmonary.   \par 4.  intermittent right upper extremity numbness suspect related to a cervical radiculopathy not an issue at this time\par 5.  H/O fibroadenoma of the breast - Rx given for yearly mammo.  Discussed that we can consider stopping mammograms based on her age.\par 6.  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 She will continue to screen closely for her colonoscopies.\par 7.  Hyperlipidemia - will check a lipid profile today\par 8.  Labs as per plan.\par 9.  Hyperparathyroidi -  Last calcium was minimally elevated.  To continue to avoid calcium supplements.  Rx given for BMD.  Needs to stay well-hydrated.\par 10.  Urge incontinence of urine - on trospium and premarin cream.  Uses the premarin weekly as she was told that this would help prevent breakdown of her sling.  Should consider stopping hormones at some point.\par 11.  Sub-cm thyroid nodule - will follow clinically\par 12.  Elevated creatinine in the past - check today\par 13. RTO in the fall for f/u Pt was seen and evaluated by me. Pt is a 78 y/o male with PMHx of HTN and DM type 2 who presented to the ED for reports of elevated K on labs done yesterday. Pt had routine labs done yesterday and was called that her K was 6. Pt denies any fever, chills, nausea, vomiting, SOB, chest pain, or abd pain. Pt denies any change in urination.  VITALS: Vitals have been reviewed.  GEN APPEARANCE: Alert and cooperative, non-toxic appearing and in NAD  HEAD: Atraumatic, normocephalic.   EYES: PERRL, EOMI.   EARS: Gross hearing intact.   NOSE: No nasal discharge.   THROAT: MMM. Oral cavity and pharynx normal.   CV: RRR, S1S2, no c/r/m/g. No cyanosis or pallor.   LUNGS: CTAB. No wheezing. No rales. No rhonchi. No diminished breath sounds.   ABDOMEN: Soft, NTND. No guarding or rebound.   MSK/EXT: Spine appears normal, no spine point tenderness.   NEURO: Alert, follows commands. Speech normal. Sensation and motor normal x4 extremities.   SKIN: Normal color for race, warm, dry and intact. No evidence of rash.  78 y/o male with PMHx of HTN and DM type 2 who presented to the ED for reports of elevated K on labs done yesterday.   Concern for Hyperkalemia, Renal Failure, Lab error  Will get labs and EKG. If noted to have elevated K, will treat for hyperkalemia

## 2025-01-21 NOTE — ED PROVIDER NOTE - PATIENT PORTAL LINK FT
You can access the FollowMyHealth Patient Portal offered by [facility] by registering at the following website: [email or web address]. By joining [facility]’s FollowMyHealth portal, you will also be able to view your health information using other applications (apps) compatible with our system.

## 2025-01-21 NOTE — ED ADULT TRIAGE NOTE - CHIEF COMPLAINT QUOTE
Pt sent into ED by PCP for elevated potassium (6.0). Pt reports multiple episodes of diarrhea this a.m. Denies any chest pain, sob, dizziness, visual disturbances,  fever/chills. PMHx HTN, DM2. Pt hypertensive in triage - took meds this a.m.

## 2025-01-21 NOTE — ED PROVIDER NOTE - CLINICAL SUMMARY MEDICAL DECISION MAKING FREE TEXT BOX
Patient presents emergency department for elevated potassium.  Patient is hemodynamically stable afebrile on presentation.  Patient physical exam unremarkable at this time.  Heart sounds normal, lungs are clear, no lower extremity edema, abdominal tenderness.  Given patient presentation we will obtain lab work to evaluate for acute pathologies.  Patient EKG shows no acute signs of hyperkalemia.  Will obtain lab work and reevaluate.